# Patient Record
Sex: FEMALE | Race: WHITE | Employment: OTHER
[De-identification: names, ages, dates, MRNs, and addresses within clinical notes are randomized per-mention and may not be internally consistent; named-entity substitution may affect disease eponyms.]

---

## 2017-01-03 ENCOUNTER — OFFICE VISIT (OUTPATIENT)
Dept: PAIN MANAGEMENT | Facility: CLINIC | Age: 72
End: 2017-01-03

## 2017-01-03 VITALS
RESPIRATION RATE: 14 BRPM | HEART RATE: 64 BPM | HEIGHT: 61 IN | BODY MASS INDEX: 24.92 KG/M2 | WEIGHT: 132 LBS | SYSTOLIC BLOOD PRESSURE: 142 MMHG | DIASTOLIC BLOOD PRESSURE: 70 MMHG

## 2017-01-03 DIAGNOSIS — M96.1 POSTLAMINECTOMY SYNDROME, LUMBAR REGION: Chronic | ICD-10-CM

## 2017-01-03 DIAGNOSIS — M96.1 POSTLAMINECTOMY SYNDROME, UNSPECIFIED REGION: ICD-10-CM

## 2017-01-03 DIAGNOSIS — M46.1 SACROILIITIS, NOT ELSEWHERE CLASSIFIED (HCC): ICD-10-CM

## 2017-01-03 DIAGNOSIS — M48.061 SPINAL STENOSIS, LUMBAR REGION, WITHOUT NEUROGENIC CLAUDICATION: Primary | Chronic | ICD-10-CM

## 2017-01-03 DIAGNOSIS — M65.312 TRIGGER FINGER OF LEFT THUMB: ICD-10-CM

## 2017-01-03 DIAGNOSIS — Z51.81 ENCOUNTER FOR MEDICATION MONITORING: Chronic | ICD-10-CM

## 2017-01-03 DIAGNOSIS — M18.11 OSTEOARTHRITIS OF CARPOMETACARPAL JOINT OF RIGHT THUMB, UNSPECIFIED OSTEOARTHRITIS TYPE: ICD-10-CM

## 2017-01-03 DIAGNOSIS — E27.40 ADRENAL INSUFFICIENCY (HCC): ICD-10-CM

## 2017-01-03 LAB
AMPHETAMINE SCREEN, URINE: NEGATIVE
BARBITURATE SCREEN, URINE: POSITIVE
BENZODIAZEPINE SCREEN, URINE: POSITIVE
COCAINE METABOLITE SCREEN URINE: NEGATIVE
MDMA URINE: NORMAL
METHADONE SCREEN, URINE: NEGATIVE
METHAMPHETAMINE, URINE: NEGATIVE
OPIATE SCREEN URINE: NEGATIVE
OXYCODONE SCREEN URINE: POSITIVE
PHENCYCLIDINE SCREEN URINE: NEGATIVE
PROPOXYPHENE SCREEN, URINE: NORMAL
THC: NEGATIVE
TRICYCLIC ANTIDEPRESSANTS, UR: NEGATIVE

## 2017-01-03 PROCEDURE — 99213 OFFICE O/P EST LOW 20 MIN: CPT | Performed by: NURSE PRACTITIONER

## 2017-01-03 PROCEDURE — 80305 DRUG TEST PRSMV DIR OPT OBS: CPT | Performed by: NURSE PRACTITIONER

## 2017-01-03 RX ORDER — LIDOCAINE 50 MG/G
5 OINTMENT TOPICAL 3 TIMES DAILY
Qty: 90 G | Refills: 3 | Status: SHIPPED | OUTPATIENT
Start: 2017-01-03 | End: 2017-03-28 | Stop reason: SDUPTHER

## 2017-01-03 RX ORDER — METHOCARBAMOL 750 MG/1
750 TABLET, FILM COATED ORAL 2 TIMES DAILY PRN
Qty: 60 TABLET | Refills: 2 | Status: SHIPPED | OUTPATIENT
Start: 2017-01-03 | End: 2017-08-15 | Stop reason: SDUPTHER

## 2017-01-03 RX ORDER — OXYCODONE AND ACETAMINOPHEN 10; 325 MG/1; MG/1
1 TABLET ORAL
Qty: 120 TABLET | Refills: 0 | Status: SHIPPED | OUTPATIENT
Start: 2017-01-07 | End: 2017-02-07 | Stop reason: SDUPTHER

## 2017-01-03 RX ORDER — OXYCODONE HYDROCHLORIDE 10 MG/1
10 TABLET, FILM COATED, EXTENDED RELEASE ORAL EVERY 12 HOURS
Qty: 90 TABLET | Refills: 0 | Status: CANCELLED | OUTPATIENT
Start: 2017-01-07 | End: 2017-02-06

## 2017-01-03 ASSESSMENT — ENCOUNTER SYMPTOMS
WHEEZING: 0
EYES NEGATIVE: 1
APNEA: 0
CONSTIPATION: 1
SINUS PRESSURE: 1
COUGH: 0
BACK PAIN: 1
ABDOMINAL PAIN: 0
RHINORRHEA: 1
SHORTNESS OF BREATH: 0

## 2017-02-07 ENCOUNTER — OFFICE VISIT (OUTPATIENT)
Dept: PAIN MANAGEMENT | Facility: CLINIC | Age: 72
End: 2017-02-07

## 2017-02-07 VITALS
HEIGHT: 61 IN | WEIGHT: 137 LBS | RESPIRATION RATE: 14 BRPM | HEART RATE: 60 BPM | SYSTOLIC BLOOD PRESSURE: 112 MMHG | DIASTOLIC BLOOD PRESSURE: 60 MMHG | BODY MASS INDEX: 25.86 KG/M2

## 2017-02-07 DIAGNOSIS — M48.061 SPINAL STENOSIS, LUMBAR REGION, WITHOUT NEUROGENIC CLAUDICATION: Chronic | ICD-10-CM

## 2017-02-07 DIAGNOSIS — M46.1 SACROILIITIS, NOT ELSEWHERE CLASSIFIED (HCC): ICD-10-CM

## 2017-02-07 DIAGNOSIS — M96.1 POSTLAMINECTOMY SYNDROME, UNSPECIFIED REGION: ICD-10-CM

## 2017-02-07 DIAGNOSIS — M96.1 POSTLAMINECTOMY SYNDROME, LUMBAR REGION: Chronic | ICD-10-CM

## 2017-02-07 PROCEDURE — 1090F PRES/ABSN URINE INCON ASSESS: CPT | Performed by: NURSE PRACTITIONER

## 2017-02-07 PROCEDURE — G8484 FLU IMMUNIZE NO ADMIN: HCPCS | Performed by: NURSE PRACTITIONER

## 2017-02-07 PROCEDURE — 4004F PT TOBACCO SCREEN RCVD TLK: CPT | Performed by: NURSE PRACTITIONER

## 2017-02-07 PROCEDURE — 99213 OFFICE O/P EST LOW 20 MIN: CPT | Performed by: NURSE PRACTITIONER

## 2017-02-07 PROCEDURE — 3017F COLORECTAL CA SCREEN DOC REV: CPT | Performed by: NURSE PRACTITIONER

## 2017-02-07 PROCEDURE — G8420 CALC BMI NORM PARAMETERS: HCPCS | Performed by: NURSE PRACTITIONER

## 2017-02-07 PROCEDURE — G8400 PT W/DXA NO RESULTS DOC: HCPCS | Performed by: NURSE PRACTITIONER

## 2017-02-07 PROCEDURE — 3014F SCREEN MAMMO DOC REV: CPT | Performed by: NURSE PRACTITIONER

## 2017-02-07 PROCEDURE — G8427 DOCREV CUR MEDS BY ELIG CLIN: HCPCS | Performed by: NURSE PRACTITIONER

## 2017-02-07 PROCEDURE — 1123F ACP DISCUSS/DSCN MKR DOCD: CPT | Performed by: NURSE PRACTITIONER

## 2017-02-07 PROCEDURE — 4040F PNEUMOC VAC/ADMIN/RCVD: CPT | Performed by: NURSE PRACTITIONER

## 2017-02-07 RX ORDER — OXYCODONE AND ACETAMINOPHEN 10; 325 MG/1; MG/1
1 TABLET ORAL
Qty: 120 TABLET | Refills: 0 | Status: SHIPPED | OUTPATIENT
Start: 2017-02-07 | End: 2017-08-28 | Stop reason: SDUPTHER

## 2017-02-07 RX ORDER — OXYCODONE AND ACETAMINOPHEN 10; 325 MG/1; MG/1
1 TABLET ORAL
Qty: 120 TABLET | Refills: 0 | Status: SHIPPED | OUTPATIENT
Start: 2017-03-09 | End: 2017-03-28 | Stop reason: SDUPTHER

## 2017-02-07 ASSESSMENT — ENCOUNTER SYMPTOMS
FACIAL SWELLING: 1
SHORTNESS OF BREATH: 0
WHEEZING: 0
SINUS PRESSURE: 1
COUGH: 1
ABDOMINAL PAIN: 0
VOMITING: 0
VOICE CHANGE: 1
BACK PAIN: 1
CONSTIPATION: 1
NAUSEA: 0

## 2017-03-28 ENCOUNTER — OFFICE VISIT (OUTPATIENT)
Dept: PAIN MANAGEMENT | Age: 72
End: 2017-03-28
Payer: MEDICARE

## 2017-03-28 VITALS
HEIGHT: 61 IN | SYSTOLIC BLOOD PRESSURE: 141 MMHG | BODY MASS INDEX: 25.68 KG/M2 | HEART RATE: 62 BPM | WEIGHT: 136 LBS | DIASTOLIC BLOOD PRESSURE: 68 MMHG | RESPIRATION RATE: 13 BRPM

## 2017-03-28 DIAGNOSIS — M96.1 POSTLAMINECTOMY SYNDROME, LUMBAR REGION: Chronic | ICD-10-CM

## 2017-03-28 DIAGNOSIS — M48.061 SPINAL STENOSIS, LUMBAR REGION, WITHOUT NEUROGENIC CLAUDICATION: Primary | Chronic | ICD-10-CM

## 2017-03-28 DIAGNOSIS — M96.1 POSTLAMINECTOMY SYNDROME, UNSPECIFIED REGION: ICD-10-CM

## 2017-03-28 DIAGNOSIS — M46.1 SACROILIITIS, NOT ELSEWHERE CLASSIFIED (HCC): ICD-10-CM

## 2017-03-28 DIAGNOSIS — Z51.81 ENCOUNTER FOR MEDICATION MONITORING: ICD-10-CM

## 2017-03-28 PROCEDURE — 3014F SCREEN MAMMO DOC REV: CPT | Performed by: NURSE PRACTITIONER

## 2017-03-28 PROCEDURE — 4004F PT TOBACCO SCREEN RCVD TLK: CPT | Performed by: NURSE PRACTITIONER

## 2017-03-28 PROCEDURE — 99214 OFFICE O/P EST MOD 30 MIN: CPT | Performed by: NURSE PRACTITIONER

## 2017-03-28 PROCEDURE — G8427 DOCREV CUR MEDS BY ELIG CLIN: HCPCS | Performed by: NURSE PRACTITIONER

## 2017-03-28 PROCEDURE — 1123F ACP DISCUSS/DSCN MKR DOCD: CPT | Performed by: NURSE PRACTITIONER

## 2017-03-28 PROCEDURE — G8400 PT W/DXA NO RESULTS DOC: HCPCS | Performed by: NURSE PRACTITIONER

## 2017-03-28 PROCEDURE — G8420 CALC BMI NORM PARAMETERS: HCPCS | Performed by: NURSE PRACTITIONER

## 2017-03-28 PROCEDURE — 1090F PRES/ABSN URINE INCON ASSESS: CPT | Performed by: NURSE PRACTITIONER

## 2017-03-28 PROCEDURE — 3017F COLORECTAL CA SCREEN DOC REV: CPT | Performed by: NURSE PRACTITIONER

## 2017-03-28 PROCEDURE — G8484 FLU IMMUNIZE NO ADMIN: HCPCS | Performed by: NURSE PRACTITIONER

## 2017-03-28 PROCEDURE — 4040F PNEUMOC VAC/ADMIN/RCVD: CPT | Performed by: NURSE PRACTITIONER

## 2017-03-28 RX ORDER — LIDOCAINE 50 MG/G
5 OINTMENT TOPICAL 3 TIMES DAILY
Qty: 90 G | Refills: 3 | Status: SHIPPED | OUTPATIENT
Start: 2017-03-28 | End: 2017-05-16 | Stop reason: SDUPTHER

## 2017-03-28 RX ORDER — GABAPENTIN 600 MG/1
600 TABLET ORAL 3 TIMES DAILY
Qty: 90 TABLET | Refills: 3 | Status: SHIPPED | OUTPATIENT
Start: 2017-03-28 | End: 2017-05-16 | Stop reason: SDUPTHER

## 2017-03-28 RX ORDER — OXYCODONE AND ACETAMINOPHEN 10; 325 MG/1; MG/1
1 TABLET ORAL
Qty: 120 TABLET | Refills: 0 | Status: SHIPPED | OUTPATIENT
Start: 2017-04-08 | End: 2017-08-28 | Stop reason: SDUPTHER

## 2017-03-28 RX ORDER — OXYCODONE AND ACETAMINOPHEN 10; 325 MG/1; MG/1
1 TABLET ORAL
Qty: 120 TABLET | Refills: 0 | Status: SHIPPED | OUTPATIENT
Start: 2017-05-08 | End: 2017-05-16 | Stop reason: SDUPTHER

## 2017-03-28 ASSESSMENT — ENCOUNTER SYMPTOMS
VOICE CHANGE: 1
ABDOMINAL PAIN: 0
VOMITING: 0
NAUSEA: 0
WHEEZING: 0
BACK PAIN: 1
COUGH: 1
CONSTIPATION: 1
SHORTNESS OF BREATH: 0
SINUS PRESSURE: 1

## 2017-05-16 ENCOUNTER — OFFICE VISIT (OUTPATIENT)
Dept: PAIN MANAGEMENT | Age: 72
End: 2017-05-16
Payer: MEDICARE

## 2017-05-16 VITALS
HEART RATE: 68 BPM | RESPIRATION RATE: 14 BRPM | HEIGHT: 61 IN | WEIGHT: 135 LBS | SYSTOLIC BLOOD PRESSURE: 154 MMHG | DIASTOLIC BLOOD PRESSURE: 70 MMHG | BODY MASS INDEX: 25.49 KG/M2

## 2017-05-16 DIAGNOSIS — M46.1 SACROILIITIS, NOT ELSEWHERE CLASSIFIED (HCC): ICD-10-CM

## 2017-05-16 DIAGNOSIS — M96.1 POSTLAMINECTOMY SYNDROME, LUMBAR REGION: Chronic | ICD-10-CM

## 2017-05-16 DIAGNOSIS — M96.1 POSTLAMINECTOMY SYNDROME, UNSPECIFIED REGION: ICD-10-CM

## 2017-05-16 DIAGNOSIS — M48.061 SPINAL STENOSIS, LUMBAR REGION, WITHOUT NEUROGENIC CLAUDICATION: Chronic | ICD-10-CM

## 2017-05-16 PROCEDURE — 3017F COLORECTAL CA SCREEN DOC REV: CPT | Performed by: INTERNAL MEDICINE

## 2017-05-16 PROCEDURE — G8427 DOCREV CUR MEDS BY ELIG CLIN: HCPCS | Performed by: INTERNAL MEDICINE

## 2017-05-16 PROCEDURE — 1090F PRES/ABSN URINE INCON ASSESS: CPT | Performed by: INTERNAL MEDICINE

## 2017-05-16 PROCEDURE — 4040F PNEUMOC VAC/ADMIN/RCVD: CPT | Performed by: INTERNAL MEDICINE

## 2017-05-16 PROCEDURE — 4004F PT TOBACCO SCREEN RCVD TLK: CPT | Performed by: INTERNAL MEDICINE

## 2017-05-16 PROCEDURE — 99214 OFFICE O/P EST MOD 30 MIN: CPT | Performed by: INTERNAL MEDICINE

## 2017-05-16 PROCEDURE — 3014F SCREEN MAMMO DOC REV: CPT | Performed by: INTERNAL MEDICINE

## 2017-05-16 PROCEDURE — 1123F ACP DISCUSS/DSCN MKR DOCD: CPT | Performed by: INTERNAL MEDICINE

## 2017-05-16 PROCEDURE — G8400 PT W/DXA NO RESULTS DOC: HCPCS | Performed by: INTERNAL MEDICINE

## 2017-05-16 PROCEDURE — G8420 CALC BMI NORM PARAMETERS: HCPCS | Performed by: INTERNAL MEDICINE

## 2017-05-16 RX ORDER — GABAPENTIN 600 MG/1
600 TABLET ORAL 3 TIMES DAILY
Qty: 90 TABLET | Refills: 3 | Status: SHIPPED | OUTPATIENT
Start: 2017-05-16 | End: 2017-09-12 | Stop reason: SDUPTHER

## 2017-05-16 RX ORDER — FENTANYL 12 UG/H
1 PATCH TRANSDERMAL
Qty: 10 PATCH | Refills: 0 | Status: SHIPPED | OUTPATIENT
Start: 2017-05-16 | End: 2017-05-16 | Stop reason: SDUPTHER

## 2017-05-16 RX ORDER — OXYCODONE AND ACETAMINOPHEN 10; 325 MG/1; MG/1
1 TABLET ORAL
Qty: 120 TABLET | Refills: 0 | Status: SHIPPED | OUTPATIENT
Start: 2017-06-07 | End: 2017-05-16 | Stop reason: SDUPTHER

## 2017-05-16 RX ORDER — LIDOCAINE 40 MG/G
CREAM TOPICAL
Qty: 45 G | Refills: 5 | Status: SHIPPED | OUTPATIENT
Start: 2017-05-16 | End: 2017-09-12 | Stop reason: SDUPTHER

## 2017-05-16 RX ORDER — LIDOCAINE 40 MG/G
CREAM TOPICAL
Qty: 45 G | Refills: 5 | Status: SHIPPED | OUTPATIENT
Start: 2017-05-16 | End: 2017-05-16 | Stop reason: SDUPTHER

## 2017-05-16 RX ORDER — LIDOCAINE 50 MG/G
5 OINTMENT TOPICAL 3 TIMES DAILY
Qty: 90 G | Refills: 3 | Status: SHIPPED | OUTPATIENT
Start: 2017-05-16 | End: 2018-04-03 | Stop reason: SDUPTHER

## 2017-05-16 RX ORDER — GABAPENTIN 600 MG/1
600 TABLET ORAL 3 TIMES DAILY
Qty: 90 TABLET | Refills: 3 | Status: SHIPPED | OUTPATIENT
Start: 2017-05-16 | End: 2017-05-16 | Stop reason: SDUPTHER

## 2017-05-16 RX ORDER — FENTANYL 12 UG/H
1 PATCH TRANSDERMAL
Qty: 10 PATCH | Refills: 0 | Status: SHIPPED | OUTPATIENT
Start: 2017-05-16 | End: 2017-06-14 | Stop reason: SDUPTHER

## 2017-05-16 RX ORDER — OXYCODONE AND ACETAMINOPHEN 10; 325 MG/1; MG/1
1 TABLET ORAL
Qty: 120 TABLET | Refills: 0 | Status: SHIPPED | OUTPATIENT
Start: 2017-06-07 | End: 2017-06-14 | Stop reason: SDUPTHER

## 2017-05-16 ASSESSMENT — ENCOUNTER SYMPTOMS
BACK PAIN: 1
ABDOMINAL PAIN: 0

## 2017-05-30 ENCOUNTER — TELEPHONE (OUTPATIENT)
Dept: PAIN MANAGEMENT | Age: 72
End: 2017-05-30

## 2017-06-05 ENCOUNTER — TELEPHONE (OUTPATIENT)
Dept: PAIN MANAGEMENT | Age: 72
End: 2017-06-05

## 2017-06-13 ENCOUNTER — HOSPITAL ENCOUNTER (OUTPATIENT)
Dept: GENERAL RADIOLOGY | Age: 72
Discharge: HOME | End: 2017-06-13
Payer: MEDICARE

## 2017-06-13 ENCOUNTER — HOSPITAL ENCOUNTER (OUTPATIENT)
Dept: PAIN MANAGEMENT | Age: 72
Discharge: HOME | End: 2017-06-13
Payer: MEDICARE

## 2017-06-13 VITALS
HEART RATE: 65 BPM | HEIGHT: 61 IN | RESPIRATION RATE: 16 BRPM | WEIGHT: 135 LBS | TEMPERATURE: 98.4 F | OXYGEN SATURATION: 93 % | DIASTOLIC BLOOD PRESSURE: 68 MMHG | BODY MASS INDEX: 25.49 KG/M2 | SYSTOLIC BLOOD PRESSURE: 178 MMHG

## 2017-06-13 DIAGNOSIS — M96.1 POSTLAMINECTOMY SYNDROME, LUMBAR REGION: ICD-10-CM

## 2017-06-13 DIAGNOSIS — M48.061 SPINAL STENOSIS, LUMBAR REGION, WITHOUT NEUROGENIC CLAUDICATION: ICD-10-CM

## 2017-06-13 DIAGNOSIS — M54.16 LUMBAR RADICULOPATHY, CHRONIC: Primary | ICD-10-CM

## 2017-06-13 PROCEDURE — 3209999900 FLUORO FOR SURGICAL PROCEDURES

## 2017-06-13 PROCEDURE — 62327 NJX INTERLAMINAR LMBR/SAC: CPT

## 2017-06-13 PROCEDURE — 62323 NJX INTERLAMINAR LMBR/SAC: CPT | Performed by: PAIN MEDICINE

## 2017-06-13 PROCEDURE — 6360000004 HC RX CONTRAST MEDICATION

## 2017-06-13 PROCEDURE — 6360000002 HC RX W HCPCS

## 2017-06-13 RX ORDER — CITALOPRAM 20 MG/1
20 TABLET ORAL DAILY
COMMUNITY
End: 2017-11-13 | Stop reason: ALTCHOICE

## 2017-06-13 RX ORDER — LEVETIRACETAM 750 MG/1
750 TABLET ORAL 2 TIMES DAILY
COMMUNITY

## 2017-06-13 RX ORDER — FERROUS SULFATE 325(65) MG
325 TABLET ORAL
COMMUNITY

## 2017-06-13 RX ORDER — POLYETHYLENE GLYCOL 3350 17 G/17G
17 POWDER, FOR SOLUTION ORAL DAILY PRN
COMMUNITY

## 2017-06-13 ASSESSMENT — PAIN - FUNCTIONAL ASSESSMENT
PAIN_FUNCTIONAL_ASSESSMENT: 0-10
PAIN_FUNCTIONAL_ASSESSMENT: 0-10

## 2017-06-14 ENCOUNTER — TELEPHONE (OUTPATIENT)
Dept: PAIN MANAGEMENT | Age: 72
End: 2017-06-14

## 2017-06-14 DIAGNOSIS — M96.1 POSTLAMINECTOMY SYNDROME, UNSPECIFIED REGION: ICD-10-CM

## 2017-06-14 DIAGNOSIS — M46.1 SACROILIITIS, NOT ELSEWHERE CLASSIFIED (HCC): ICD-10-CM

## 2017-06-14 DIAGNOSIS — M48.061 SPINAL STENOSIS, LUMBAR REGION, WITHOUT NEUROGENIC CLAUDICATION: Chronic | ICD-10-CM

## 2017-06-14 DIAGNOSIS — M96.1 POSTLAMINECTOMY SYNDROME, LUMBAR REGION: Chronic | ICD-10-CM

## 2017-06-14 RX ORDER — OXYCODONE AND ACETAMINOPHEN 10; 325 MG/1; MG/1
1 TABLET ORAL EVERY 6 HOURS PRN
Qty: 20 TABLET | Refills: 0 | Status: SHIPPED | OUTPATIENT
Start: 2017-07-07 | End: 2017-08-15 | Stop reason: SDUPTHER

## 2017-06-14 RX ORDER — OXYCODONE AND ACETAMINOPHEN 10; 325 MG/1; MG/1
1 TABLET ORAL EVERY 6 HOURS PRN
Qty: 20 TABLET | Refills: 0 | Status: SHIPPED | OUTPATIENT
Start: 2017-06-07 | End: 2017-08-28 | Stop reason: SDUPTHER

## 2017-06-14 RX ORDER — FENTANYL 12 UG/H
1 PATCH TRANSDERMAL
Qty: 10 PATCH | Refills: 0 | Status: SHIPPED | OUTPATIENT
Start: 2017-06-16 | End: 2017-07-11 | Stop reason: SDUPTHER

## 2017-06-28 ENCOUNTER — TELEPHONE (OUTPATIENT)
Dept: PAIN MANAGEMENT | Age: 72
End: 2017-06-28

## 2017-07-11 ENCOUNTER — OFFICE VISIT (OUTPATIENT)
Dept: PAIN MANAGEMENT | Age: 72
End: 2017-07-11
Payer: MEDICARE

## 2017-07-11 VITALS
SYSTOLIC BLOOD PRESSURE: 139 MMHG | RESPIRATION RATE: 14 BRPM | BODY MASS INDEX: 25.49 KG/M2 | HEIGHT: 61 IN | WEIGHT: 135 LBS | HEART RATE: 56 BPM | DIASTOLIC BLOOD PRESSURE: 66 MMHG

## 2017-07-11 DIAGNOSIS — M47.26 OTHER SPONDYLOSIS WITH RADICULOPATHY, LUMBAR REGION: ICD-10-CM

## 2017-07-11 DIAGNOSIS — M96.1 POSTLAMINECTOMY SYNDROME, UNSPECIFIED REGION: ICD-10-CM

## 2017-07-11 DIAGNOSIS — M46.1 SACROILIITIS, NOT ELSEWHERE CLASSIFIED (HCC): ICD-10-CM

## 2017-07-11 DIAGNOSIS — M96.1 POSTLAMINECTOMY SYNDROME, LUMBAR REGION: Chronic | ICD-10-CM

## 2017-07-11 DIAGNOSIS — M48.062 SPINAL STENOSIS OF LUMBAR REGION WITH NEUROGENIC CLAUDICATION: Primary | ICD-10-CM

## 2017-07-11 LAB
AMPHETAMINE SCREEN, URINE: NEGATIVE
BARBITURATE SCREEN, URINE: POSITIVE
BENZODIAZEPINE SCREEN, URINE: NEGATIVE
COCAINE METABOLITE SCREEN URINE: NEGATIVE
MDMA URINE: NORMAL
METHADONE SCREEN, URINE: NEGATIVE
METHAMPHETAMINE, URINE: NEGATIVE
OPIATE SCREEN URINE: NEGATIVE
OXYCODONE SCREEN URINE: POSITIVE
PHENCYCLIDINE SCREEN URINE: NEGATIVE
PROPOXYPHENE SCREEN, URINE: NORMAL
THC: NEGATIVE
TRICYCLIC ANTIDEPRESSANTS, UR: NEGATIVE

## 2017-07-11 PROCEDURE — G8427 DOCREV CUR MEDS BY ELIG CLIN: HCPCS | Performed by: INTERNAL MEDICINE

## 2017-07-11 PROCEDURE — 1090F PRES/ABSN URINE INCON ASSESS: CPT | Performed by: INTERNAL MEDICINE

## 2017-07-11 PROCEDURE — 99214 OFFICE O/P EST MOD 30 MIN: CPT | Performed by: INTERNAL MEDICINE

## 2017-07-11 PROCEDURE — 4004F PT TOBACCO SCREEN RCVD TLK: CPT | Performed by: INTERNAL MEDICINE

## 2017-07-11 PROCEDURE — G8419 CALC BMI OUT NRM PARAM NOF/U: HCPCS | Performed by: INTERNAL MEDICINE

## 2017-07-11 PROCEDURE — 3014F SCREEN MAMMO DOC REV: CPT | Performed by: INTERNAL MEDICINE

## 2017-07-11 PROCEDURE — 4040F PNEUMOC VAC/ADMIN/RCVD: CPT | Performed by: INTERNAL MEDICINE

## 2017-07-11 PROCEDURE — 3017F COLORECTAL CA SCREEN DOC REV: CPT | Performed by: INTERNAL MEDICINE

## 2017-07-11 PROCEDURE — G8400 PT W/DXA NO RESULTS DOC: HCPCS | Performed by: INTERNAL MEDICINE

## 2017-07-11 PROCEDURE — 80305 DRUG TEST PRSMV DIR OPT OBS: CPT | Performed by: INTERNAL MEDICINE

## 2017-07-11 PROCEDURE — 1123F ACP DISCUSS/DSCN MKR DOCD: CPT | Performed by: INTERNAL MEDICINE

## 2017-07-11 RX ORDER — OXYCODONE AND ACETAMINOPHEN 10; 325 MG/1; MG/1
1 TABLET ORAL
Qty: 120 TABLET | Refills: 0 | Status: SHIPPED | OUTPATIENT
Start: 2017-07-11 | End: 2017-08-28 | Stop reason: SDUPTHER

## 2017-07-11 RX ORDER — FENTANYL 12 UG/H
1 PATCH TRANSDERMAL
Qty: 10 PATCH | Refills: 0 | Status: SHIPPED | OUTPATIENT
Start: 2017-07-16 | End: 2017-08-15 | Stop reason: SDUPTHER

## 2017-07-11 ASSESSMENT — ENCOUNTER SYMPTOMS
ABDOMINAL PAIN: 0
BACK PAIN: 1

## 2017-08-15 ENCOUNTER — OFFICE VISIT (OUTPATIENT)
Dept: PAIN MANAGEMENT | Age: 72
End: 2017-08-15
Payer: MEDICARE

## 2017-08-15 VITALS
DIASTOLIC BLOOD PRESSURE: 69 MMHG | HEART RATE: 59 BPM | HEIGHT: 61 IN | RESPIRATION RATE: 14 BRPM | SYSTOLIC BLOOD PRESSURE: 145 MMHG | WEIGHT: 135 LBS | BODY MASS INDEX: 25.49 KG/M2

## 2017-08-15 DIAGNOSIS — M96.1 POSTLAMINECTOMY SYNDROME, UNSPECIFIED REGION: ICD-10-CM

## 2017-08-15 DIAGNOSIS — M96.1 POSTLAMINECTOMY SYNDROME, LUMBAR REGION: Chronic | ICD-10-CM

## 2017-08-15 DIAGNOSIS — M48.061 SPINAL STENOSIS, LUMBAR REGION, WITHOUT NEUROGENIC CLAUDICATION: Chronic | ICD-10-CM

## 2017-08-15 DIAGNOSIS — M46.1 SACROILIITIS, NOT ELSEWHERE CLASSIFIED (HCC): ICD-10-CM

## 2017-08-15 PROCEDURE — 3017F COLORECTAL CA SCREEN DOC REV: CPT | Performed by: INTERNAL MEDICINE

## 2017-08-15 PROCEDURE — G8419 CALC BMI OUT NRM PARAM NOF/U: HCPCS | Performed by: INTERNAL MEDICINE

## 2017-08-15 PROCEDURE — 1090F PRES/ABSN URINE INCON ASSESS: CPT | Performed by: INTERNAL MEDICINE

## 2017-08-15 PROCEDURE — G8427 DOCREV CUR MEDS BY ELIG CLIN: HCPCS | Performed by: INTERNAL MEDICINE

## 2017-08-15 PROCEDURE — 1123F ACP DISCUSS/DSCN MKR DOCD: CPT | Performed by: INTERNAL MEDICINE

## 2017-08-15 PROCEDURE — 4004F PT TOBACCO SCREEN RCVD TLK: CPT | Performed by: INTERNAL MEDICINE

## 2017-08-15 PROCEDURE — 99214 OFFICE O/P EST MOD 30 MIN: CPT | Performed by: INTERNAL MEDICINE

## 2017-08-15 PROCEDURE — G8400 PT W/DXA NO RESULTS DOC: HCPCS | Performed by: INTERNAL MEDICINE

## 2017-08-15 PROCEDURE — 4040F PNEUMOC VAC/ADMIN/RCVD: CPT | Performed by: INTERNAL MEDICINE

## 2017-08-15 PROCEDURE — 3014F SCREEN MAMMO DOC REV: CPT | Performed by: INTERNAL MEDICINE

## 2017-08-15 RX ORDER — OXYCODONE AND ACETAMINOPHEN 10; 325 MG/1; MG/1
1 TABLET ORAL EVERY 6 HOURS PRN
Qty: 20 TABLET | Refills: 0 | Status: SHIPPED | OUTPATIENT
Start: 2017-08-15 | End: 2018-04-03 | Stop reason: SDUPTHER

## 2017-08-15 RX ORDER — FENTANYL 12 UG/H
1 PATCH TRANSDERMAL
Qty: 10 PATCH | Refills: 0 | Status: SHIPPED | OUTPATIENT
Start: 2017-08-15 | End: 2017-09-12 | Stop reason: SDUPTHER

## 2017-08-15 RX ORDER — METHOCARBAMOL 750 MG/1
750 TABLET, FILM COATED ORAL 2 TIMES DAILY PRN
Qty: 60 TABLET | Refills: 2 | Status: SHIPPED | OUTPATIENT
Start: 2017-08-15 | End: 2017-10-10 | Stop reason: SDUPTHER

## 2017-08-15 ASSESSMENT — ENCOUNTER SYMPTOMS
VOMITING: 0
BACK PAIN: 1
DOUBLE VISION: 0
DIARRHEA: 0
NAUSEA: 0
BLURRED VISION: 0
ABDOMINAL PAIN: 0

## 2017-08-23 ENCOUNTER — TELEPHONE (OUTPATIENT)
Dept: PAIN MANAGEMENT | Age: 72
End: 2017-08-23

## 2017-08-28 ENCOUNTER — HOSPITAL ENCOUNTER (OUTPATIENT)
Dept: GENERAL RADIOLOGY | Age: 72
Discharge: HOME | End: 2017-08-28
Payer: MEDICARE

## 2017-08-28 ENCOUNTER — HOSPITAL ENCOUNTER (OUTPATIENT)
Dept: PAIN MANAGEMENT | Age: 72
Discharge: HOME | End: 2017-08-28
Payer: MEDICARE

## 2017-08-28 VITALS
HEIGHT: 61 IN | SYSTOLIC BLOOD PRESSURE: 156 MMHG | HEART RATE: 62 BPM | BODY MASS INDEX: 25.49 KG/M2 | RESPIRATION RATE: 16 BRPM | DIASTOLIC BLOOD PRESSURE: 70 MMHG | TEMPERATURE: 98.4 F | WEIGHT: 135 LBS | OXYGEN SATURATION: 94 %

## 2017-08-28 DIAGNOSIS — M54.16 LUMBAR RADICULOPATHY, CHRONIC: Primary | ICD-10-CM

## 2017-08-28 DIAGNOSIS — M96.1 POSTLAMINECTOMY SYNDROME, UNSPECIFIED REGION: ICD-10-CM

## 2017-08-28 DIAGNOSIS — R52 PAIN: ICD-10-CM

## 2017-08-28 DIAGNOSIS — M48.062 SPINAL STENOSIS OF LUMBAR REGION WITH NEUROGENIC CLAUDICATION: ICD-10-CM

## 2017-08-28 PROCEDURE — 3209999900 FLUORO FOR SURGICAL PROCEDURES

## 2017-08-28 PROCEDURE — 20552 NJX 1/MLT TRIGGER POINT 1/2: CPT

## 2017-08-28 PROCEDURE — 6360000002 HC RX W HCPCS

## 2017-08-28 PROCEDURE — 77002 NEEDLE LOCALIZATION BY XRAY: CPT

## 2017-08-28 PROCEDURE — 62323 NJX INTERLAMINAR LMBR/SAC: CPT | Performed by: PAIN MEDICINE

## 2017-08-28 PROCEDURE — 6360000004 HC RX CONTRAST MEDICATION

## 2017-08-28 RX ORDER — PRIMIDONE 50 MG/1
200 TABLET ORAL NIGHTLY
COMMUNITY

## 2017-08-28 RX ORDER — GUAIFENESIN 600 MG/1
1200 TABLET, EXTENDED RELEASE ORAL 2 TIMES DAILY
COMMUNITY

## 2017-08-28 RX ORDER — DOCUSATE SODIUM 100 MG/1
100 CAPSULE, LIQUID FILLED ORAL 2 TIMES DAILY
COMMUNITY

## 2017-08-28 RX ORDER — OXYCODONE AND ACETAMINOPHEN 10; 325 MG/1; MG/1
1 TABLET ORAL EVERY 6 HOURS
Qty: 120 TABLET | Refills: 0 | Status: SHIPPED | OUTPATIENT
Start: 2017-08-28 | End: 2017-10-10 | Stop reason: SDUPTHER

## 2017-08-28 RX ORDER — OXYCODONE AND ACETAMINOPHEN 10; 325 MG/1; MG/1
1 TABLET ORAL EVERY 6 HOURS
Qty: 120 TABLET | Refills: 0 | Status: SHIPPED | OUTPATIENT
Start: 2017-08-28 | End: 2017-08-28 | Stop reason: SDUPTHER

## 2017-08-28 RX ORDER — MOMETASONE FUROATE 50 UG/1
2 SPRAY, METERED NASAL DAILY
COMMUNITY
End: 2018-10-29

## 2017-08-28 RX ORDER — NITROGLYCERIN 0.4 MG/1
0.4 TABLET SUBLINGUAL EVERY 5 MIN PRN
COMMUNITY

## 2017-08-28 ASSESSMENT — PAIN DESCRIPTION - FREQUENCY: FREQUENCY: CONTINUOUS

## 2017-08-28 ASSESSMENT — PAIN DESCRIPTION - PAIN TYPE: TYPE: CHRONIC PAIN

## 2017-08-28 ASSESSMENT — PAIN DESCRIPTION - LOCATION: LOCATION: BACK

## 2017-08-28 ASSESSMENT — PAIN DESCRIPTION - DESCRIPTORS: DESCRIPTORS: ACHING

## 2017-08-28 ASSESSMENT — PAIN DESCRIPTION - PROGRESSION: CLINICAL_PROGRESSION: NOT CHANGED

## 2017-08-28 ASSESSMENT — ACTIVITIES OF DAILY LIVING (ADL): EFFECT OF PAIN ON DAILY ACTIVITIES: PAIN INCREASES WITH PHYSICAL ACTIVITY

## 2017-08-28 ASSESSMENT — PAIN SCALES - GENERAL: PAINLEVEL_OUTOF10: 8

## 2017-08-28 ASSESSMENT — PAIN DESCRIPTION - ORIENTATION: ORIENTATION: LOWER;LEFT;UPPER

## 2017-08-28 ASSESSMENT — PAIN DESCRIPTION - ONSET: ONSET: ON-GOING

## 2017-08-28 ASSESSMENT — PAIN - FUNCTIONAL ASSESSMENT: PAIN_FUNCTIONAL_ASSESSMENT: 0-10

## 2017-08-29 ENCOUNTER — TELEPHONE (OUTPATIENT)
Dept: PAIN MANAGEMENT | Age: 72
End: 2017-08-29

## 2017-09-12 ENCOUNTER — OFFICE VISIT (OUTPATIENT)
Dept: PAIN MANAGEMENT | Age: 72
End: 2017-09-12
Payer: MEDICARE

## 2017-09-12 VITALS
SYSTOLIC BLOOD PRESSURE: 185 MMHG | HEIGHT: 61 IN | DIASTOLIC BLOOD PRESSURE: 79 MMHG | HEART RATE: 64 BPM | RESPIRATION RATE: 15 BRPM | BODY MASS INDEX: 25.49 KG/M2 | WEIGHT: 135 LBS

## 2017-09-12 DIAGNOSIS — M96.1 POSTLAMINECTOMY SYNDROME, UNSPECIFIED REGION: ICD-10-CM

## 2017-09-12 DIAGNOSIS — M46.1 SACROILIITIS, NOT ELSEWHERE CLASSIFIED (HCC): ICD-10-CM

## 2017-09-12 DIAGNOSIS — M96.1 POSTLAMINECTOMY SYNDROME, LUMBAR REGION: Chronic | ICD-10-CM

## 2017-09-12 DIAGNOSIS — M48.061 SPINAL STENOSIS, LUMBAR REGION, WITHOUT NEUROGENIC CLAUDICATION: Chronic | ICD-10-CM

## 2017-09-12 PROCEDURE — 3017F COLORECTAL CA SCREEN DOC REV: CPT | Performed by: INTERNAL MEDICINE

## 2017-09-12 PROCEDURE — 4040F PNEUMOC VAC/ADMIN/RCVD: CPT | Performed by: INTERNAL MEDICINE

## 2017-09-12 PROCEDURE — G8427 DOCREV CUR MEDS BY ELIG CLIN: HCPCS | Performed by: INTERNAL MEDICINE

## 2017-09-12 PROCEDURE — 3014F SCREEN MAMMO DOC REV: CPT | Performed by: INTERNAL MEDICINE

## 2017-09-12 PROCEDURE — G8417 CALC BMI ABV UP PARAM F/U: HCPCS | Performed by: INTERNAL MEDICINE

## 2017-09-12 PROCEDURE — 99214 OFFICE O/P EST MOD 30 MIN: CPT | Performed by: INTERNAL MEDICINE

## 2017-09-12 PROCEDURE — G8400 PT W/DXA NO RESULTS DOC: HCPCS | Performed by: INTERNAL MEDICINE

## 2017-09-12 PROCEDURE — 1090F PRES/ABSN URINE INCON ASSESS: CPT | Performed by: INTERNAL MEDICINE

## 2017-09-12 PROCEDURE — 4004F PT TOBACCO SCREEN RCVD TLK: CPT | Performed by: INTERNAL MEDICINE

## 2017-09-12 PROCEDURE — 1123F ACP DISCUSS/DSCN MKR DOCD: CPT | Performed by: INTERNAL MEDICINE

## 2017-09-12 RX ORDER — OXYCODONE AND ACETAMINOPHEN 10; 325 MG/1; MG/1
1 TABLET ORAL EVERY 6 HOURS
Qty: 120 TABLET | Refills: 0 | Status: SHIPPED | OUTPATIENT
Start: 2017-09-28 | End: 2018-04-03 | Stop reason: SDUPTHER

## 2017-09-12 RX ORDER — FENTANYL 12 UG/H
1 PATCH TRANSDERMAL
Qty: 10 PATCH | Refills: 0 | Status: SHIPPED | OUTPATIENT
Start: 2017-09-15 | End: 2017-10-10 | Stop reason: SDUPTHER

## 2017-09-12 RX ORDER — LIDOCAINE 40 MG/G
CREAM TOPICAL
Qty: 45 G | Refills: 5 | Status: SHIPPED | OUTPATIENT
Start: 2017-09-12 | End: 2017-11-13 | Stop reason: SDUPTHER

## 2017-09-12 RX ORDER — GABAPENTIN 600 MG/1
600 TABLET ORAL 3 TIMES DAILY
Qty: 90 TABLET | Refills: 3 | Status: SHIPPED | OUTPATIENT
Start: 2017-09-12 | End: 2018-02-05 | Stop reason: SDUPTHER

## 2017-09-12 ASSESSMENT — ENCOUNTER SYMPTOMS
BACK PAIN: 1
ABDOMINAL PAIN: 0

## 2017-09-27 ENCOUNTER — TELEPHONE (OUTPATIENT)
Dept: PAIN MANAGEMENT | Age: 72
End: 2017-09-27

## 2017-09-28 NOTE — TELEPHONE ENCOUNTER
Talked to [facility] today. They have to figure out why she is running out and they said they will and will call us. Please leave a message if there is a pattern that is running out of Rx. Also as far as I know Pharmacy can fill it two days early ( legally allowed to fill two days early) as well, so this type of difficulty does not arise.      Electronically signed by Candelaria Uriostegui MD on 9/28/2017 at 11:55 AM

## 2017-10-10 ENCOUNTER — OFFICE VISIT (OUTPATIENT)
Dept: PAIN MANAGEMENT | Age: 72
End: 2017-10-10
Payer: MEDICARE

## 2017-10-10 VITALS
SYSTOLIC BLOOD PRESSURE: 152 MMHG | RESPIRATION RATE: 15 BRPM | HEIGHT: 61 IN | HEART RATE: 61 BPM | BODY MASS INDEX: 25.49 KG/M2 | DIASTOLIC BLOOD PRESSURE: 71 MMHG | WEIGHT: 135 LBS

## 2017-10-10 DIAGNOSIS — M46.1 SACROILIITIS, NOT ELSEWHERE CLASSIFIED (HCC): ICD-10-CM

## 2017-10-10 DIAGNOSIS — M96.1 POSTLAMINECTOMY SYNDROME, UNSPECIFIED REGION: ICD-10-CM

## 2017-10-10 DIAGNOSIS — M48.061 SPINAL STENOSIS, LUMBAR REGION, WITHOUT NEUROGENIC CLAUDICATION: Chronic | ICD-10-CM

## 2017-10-10 DIAGNOSIS — M96.1 POSTLAMINECTOMY SYNDROME, LUMBAR REGION: Chronic | ICD-10-CM

## 2017-10-10 PROCEDURE — G8484 FLU IMMUNIZE NO ADMIN: HCPCS | Performed by: INTERNAL MEDICINE

## 2017-10-10 PROCEDURE — 4004F PT TOBACCO SCREEN RCVD TLK: CPT | Performed by: INTERNAL MEDICINE

## 2017-10-10 PROCEDURE — 1123F ACP DISCUSS/DSCN MKR DOCD: CPT | Performed by: INTERNAL MEDICINE

## 2017-10-10 PROCEDURE — 3017F COLORECTAL CA SCREEN DOC REV: CPT | Performed by: INTERNAL MEDICINE

## 2017-10-10 PROCEDURE — 3014F SCREEN MAMMO DOC REV: CPT | Performed by: INTERNAL MEDICINE

## 2017-10-10 PROCEDURE — G8427 DOCREV CUR MEDS BY ELIG CLIN: HCPCS | Performed by: INTERNAL MEDICINE

## 2017-10-10 PROCEDURE — G8417 CALC BMI ABV UP PARAM F/U: HCPCS | Performed by: INTERNAL MEDICINE

## 2017-10-10 PROCEDURE — 1090F PRES/ABSN URINE INCON ASSESS: CPT | Performed by: INTERNAL MEDICINE

## 2017-10-10 PROCEDURE — 99214 OFFICE O/P EST MOD 30 MIN: CPT | Performed by: INTERNAL MEDICINE

## 2017-10-10 PROCEDURE — 4040F PNEUMOC VAC/ADMIN/RCVD: CPT | Performed by: INTERNAL MEDICINE

## 2017-10-10 PROCEDURE — G8400 PT W/DXA NO RESULTS DOC: HCPCS | Performed by: INTERNAL MEDICINE

## 2017-10-10 RX ORDER — FENTANYL 12 UG/H
1 PATCH TRANSDERMAL
Qty: 10 PATCH | Refills: 0 | Status: SHIPPED | OUTPATIENT
Start: 2017-10-15 | End: 2017-11-13 | Stop reason: SDUPTHER

## 2017-10-10 RX ORDER — METHOCARBAMOL 750 MG/1
750 TABLET, FILM COATED ORAL 2 TIMES DAILY PRN
Qty: 60 TABLET | Refills: 2 | Status: SHIPPED | OUTPATIENT
Start: 2017-10-10 | End: 2018-04-03 | Stop reason: ALTCHOICE

## 2017-10-10 RX ORDER — OXYCODONE AND ACETAMINOPHEN 10; 325 MG/1; MG/1
1 TABLET ORAL EVERY 6 HOURS
Qty: 120 TABLET | Refills: 0 | Status: SHIPPED | OUTPATIENT
Start: 2017-10-15 | End: 2017-11-13 | Stop reason: SDUPTHER

## 2017-10-10 ASSESSMENT — ENCOUNTER SYMPTOMS
BACK PAIN: 1
ABDOMINAL PAIN: 0

## 2017-10-10 NOTE — PROGRESS NOTES
Current Outpatient Prescriptions   Medication Sig Dispense Refill    [START ON 10/15/2017] fentaNYL (DURAGESIC) 12 MCG/HR Place 1 patch onto the skin every 72 hours . Earliest Fill Date: 10/15/17 10 patch 0    [START ON 10/15/2017] oxyCODONE-acetaminophen (PERCOCET)  MG per tablet Take 1 tablet by mouth every 6 hours  Please give up to 4 AM, 10 AM, 4 PM, 10 PM. Earliest Fill Date: 10/15/17 120 tablet 0    methocarbamol (ROBAXIN) 750 MG tablet Take 1 tablet by mouth 2 times daily as needed (painful muscle spasms in legs.) 60 tablet 2    lidocaine (ASPERCREME W/LIDOCAINE) 4 % cream Indications: Knee Pain, Pain in Joints of Foot and Toes Apply topically as needed. 45 g 5    gabapentin (NEURONTIN) 600 MG tablet Take 1 tablet by mouth 3 times daily 90 tablet 3    primidone (MYSOLINE) 50 MG tablet Take 100 mg by mouth nightly      mometasone (NASONEX) 50 MCG/ACT nasal spray 2 sprays by Nasal route daily      nefazodone (SERZONE) 100 MG tablet Take 100 mg by mouth daily      nefazodone (SERZONE) 100 MG tablet Take 100 mg by mouth daily      Hypromellose (ARTIFICIAL TEARS OP) Apply to eye      docusate sodium (COLACE) 100 MG capsule Take 100 mg by mouth 2 times daily      bisacodyl (DULCOLAX) 5 MG EC tablet Take 5 mg by mouth daily as needed for Constipation      guaiFENesin (MUCINEX) 600 MG extended release tablet Take 1,200 mg by mouth 2 times daily      nitroGLYCERIN (NITROSTAT) 0.4 MG SL tablet Place 0.4 mg under the tongue every 5 minutes as needed for Chest pain up to max of 3 total doses. If no relief after 1 dose, call 911.  oxyCODONE-acetaminophen (PERCOCET)  MG per tablet Take 1 tablet by mouth every 6 hours as needed for Pain .  Earliest Fill Date: 8/15/17 20 tablet 0    citalopram (CELEXA) 20 MG tablet Take 20 mg by mouth daily      Calcium Carbonate-Vitamin D (CALCIUM-VITAMIN D) 500-200 MG-UNIT per tablet Take 1 tablet by mouth 2 times daily (with meals) Indications: 600MG-400      ferrous sulfate 325 (65 Fe) MG tablet Take 325 mg by mouth daily (with breakfast)      levETIRAcetam (KEPPRA) 750 MG tablet Take 750 mg by mouth 2 times daily      polyethylene glycol (GLYCOLAX) packet Take 17 g by mouth daily as needed for Constipation      Simethicone 40 MG/0.6ML LIQD Take 125 mg by mouth      lidocaine (XYLOCAINE) 5 % ointment Apply 5 g topically 3 times daily Apply topically as needed. 90 g 3    b complex vitamins capsule Take 1 capsule by mouth daily      carbamide peroxide (DEBROX) 6.5 % otic solution 2 drops 2 times daily      phenytoin (DILANTIN) 100 MG ER capsule Take 100 mg by mouth 2 times daily      loratadine (CLARITIN) 10 MG capsule Take 10 mg by mouth daily      promethazine (PHENERGAN) 25 MG tablet Take 25 mg by mouth every 6 hours as needed for Nausea      amLODIPine (NORVASC) 2.5 MG tablet Take 5 mg by mouth daily       acetaminophen (TYLENOL) 500 MG tablet Take 500 mg by mouth every 6 hours as needed for Pain      diphenhydrAMINE (BENADRYL) 25 MG tablet Take 25 mg by mouth every 6 hours as needed for Itching      loperamide (IMODIUM) 2 MG capsule Take 2 mg by mouth 4 times daily as needed for Diarrhea      risperiDONE (RISPERDAL) 0.25 MG tablet Take 0.25 mg by mouth 2 times daily      metoprolol (TOPROL-XL) 25 MG XL tablet Take 25 mg by mouth daily.  hydrocortisone (CORTEF) 5 MG tablet Take 5 mg by mouth daily.  aspirin 81 MG tablet Take 81 mg by mouth daily.  furosemide (LASIX) 20 MG tablet Take 40 mg by mouth daily       LORazepam (ATIVAN) 0.5 MG tablet Take 0.5 mg by mouth every 8 hours as needed for Anxiety. May take a fourth pill as needed for extreme stress      lisinopril (PRINIVIL;ZESTRIL) 20 MG tablet Take 20 mg by mouth daily.  methimazole (TAPAZOLE) 10 MG tablet Take 10 mg by mouth daily Take one daily for only 7 days a week.       esomeprazole (NEXIUM) 40 MG capsule Take 40 mg by mouth every morning (before 4 AM, 10 AM, 4 PM, 10 PM. Earliest Fill Date: 10/15/17 120 tablet 0    methocarbamol (ROBAXIN) 750 MG tablet Take 1 tablet by mouth 2 times daily as needed (painful muscle spasms in legs.) 60 tablet 2    lidocaine (ASPERCREME W/LIDOCAINE) 4 % cream Indications: Knee Pain, Pain in Joints of Foot and Toes Apply topically as needed. 45 g 5    gabapentin (NEURONTIN) 600 MG tablet Take 1 tablet by mouth 3 times daily 90 tablet 3    primidone (MYSOLINE) 50 MG tablet Take 100 mg by mouth nightly      mometasone (NASONEX) 50 MCG/ACT nasal spray 2 sprays by Nasal route daily      nefazodone (SERZONE) 100 MG tablet Take 100 mg by mouth daily      nefazodone (SERZONE) 100 MG tablet Take 100 mg by mouth daily      Hypromellose (ARTIFICIAL TEARS OP) Apply to eye      docusate sodium (COLACE) 100 MG capsule Take 100 mg by mouth 2 times daily      bisacodyl (DULCOLAX) 5 MG EC tablet Take 5 mg by mouth daily as needed for Constipation      guaiFENesin (MUCINEX) 600 MG extended release tablet Take 1,200 mg by mouth 2 times daily      nitroGLYCERIN (NITROSTAT) 0.4 MG SL tablet Place 0.4 mg under the tongue every 5 minutes as needed for Chest pain up to max of 3 total doses. If no relief after 1 dose, call 911.  oxyCODONE-acetaminophen (PERCOCET)  MG per tablet Take 1 tablet by mouth every 6 hours as needed for Pain .  Earliest Fill Date: 8/15/17 20 tablet 0    citalopram (CELEXA) 20 MG tablet Take 20 mg by mouth daily      Calcium Carbonate-Vitamin D (CALCIUM-VITAMIN D) 500-200 MG-UNIT per tablet Take 1 tablet by mouth 2 times daily (with meals) Indications: 600MG-400      ferrous sulfate 325 (65 Fe) MG tablet Take 325 mg by mouth daily (with breakfast)      levETIRAcetam (KEPPRA) 750 MG tablet Take 750 mg by mouth 2 times daily      polyethylene glycol (GLYCOLAX) packet Take 17 g by mouth daily as needed for Constipation      Simethicone 40 MG/0.6ML LIQD Take 125 mg by mouth      lidocaine (XYLOCAINE) 5 40 mg  40 mg Intra-articular Once Oralia Ramires MD        bupivacaine (MARCAINE) 0.5 % injection 150 mg  30 mL Intra-articular Once Oralia Ramires MD        lidocaine 2 % injection 5 mL  5 mL Intradermal Once Oralia Ramires MD        iohexol (OMNIPAQUE 180) injection 3 mL  3 mL Other ONCE PRN Oralia Ramires MD           Allergies:      Allergies   Allergen Reactions    Flagyl [Metronidazole]     Morphine Itching and Swelling     Of eye lids    Nsaids      Kidney problems    Statins Other (See Comments)     Joint pain     Keflex [Cephalexin] Nausea And Vomiting       Past Medical History:     Past Medical History:   Diagnosis Date    Adrenal insufficiency (Moe's disease) ([facility])     on hydrocortisone daily    Anemia     Anxiety     Anxiety     Atrial fibrillation ([facility])     CAD (coronary artery disease)     Cellulitis     Cerebral vascular disease     CHF (congestive heart failure) ([facility])     Chronic kidney disease     Colon cancer ([facility])     Depression     Fibromyalgia     Fracture     right foot, no surgery    Heart disease     History of blood transfusion     Hyperlipidemia     Hypertension     Hypokalemia     Lumbosacral spondylosis without myelopathy     Myalgia and myositis, unspecified     Myocardial infarct ([facility])     Opioid type dependence, continuous ([facility])     Osteoarthritis of ankle, left     Osteoarthritis of both hips     Osteoarthritis of both knees     Osteoporosis     Peripheral neuropathy ([facility])     Peripheral vascular disease ([facility])     Polyneuropathy ([facility])     Post traumatic stress disorder (PTSD)     Postlaminectomy syndrome, lumbar region     Thyrotoxic crisis     Tremors of nervous system        Past Surgical History:     Past Surgical History:   Procedure Laterality Date    ABDOMEN SURGERY      bowel resection    ANGIOPLASTY      LCx, Prox    APPENDECTOMY      BACK SURGERY      CARDIAC SURGERY      2 heart stents    CAROTID ENDARTERECTOMY Bilateral      SECTION      x1    COLECTOMY  2009    EYE SURGERY      eye lids    FRACTURE SURGERY      left ankle    HYSTERECTOMY      LUMBAR FUSION  2006    OVARY REMOVAL      TONSILLECTOMY         Family History:     Family History   Problem Relation Age of Onset    Heart Disease Mother     Diabetes Mother     Other Mother      lung disease       Social History:      TOBACCO:   reports that she has been smoking. She has a 75.00 pack-year smoking history. She has never used smokeless tobacco.  ETOH:   reports that she does not drink alcohol. REVIEW OF SYSTEMS:     Review of Systems   Constitutional: Negative for fever and weight loss. Cardiovascular: Negative for chest pain. Gastrointestinal: Negative for abdominal pain (lower abdominal pain due to cramps and gaseousness. She had C.difficile. x 3 months. ). Genitourinary: Negative for dysuria. Musculoskeletal: Positive for back pain. Neurological: Positive for tingling, weakness and numbness. Negative for headaches. PHYSICAL EXAM:     Recent Vitals:     Vitals:    10/10/17 1159   BP: (!) 152/71   Pulse: 61   Resp: 15     Body mass index is 25.51 kg/m². Physical Exam   Constitutional: She is oriented to person, place, and time. Vital signs are normal. She appears well-developed and well-nourished. No distress. HENT:   Head: Normocephalic and atraumatic. Eyes: Conjunctivae and EOM are normal.   Neck: Trachea normal. Neck supple. Cardiovascular: An irregular rhythm present. Occasional extrasystoles are present. Exam reveals decreased pulses. Murmur heard. Systolic murmur is present with a grade of 2/6   2 to 3 + leg edema bilateral.    Pulmonary/Chest: Effort normal and breath sounds normal. No respiratory distress. Abdominal: Bowel sounds are normal. She exhibits no distension. There is no tenderness. Musculoskeletal: She exhibits deformity (thoracic kyphosis,  absence of lumbar lordosis). She exhibits no edema. Arms:  Lumbar movements of flexion, extension, lateral flexion and rotation are painful and restricted. SLR seated is painful bilaterally. Kyree's test not attempted. Sensations reduced over lower extremities. Neurological: She is alert and oriented to person, place, and time. She has normal reflexes. She is not disoriented. She displays atrophy. No sensory deficit. Gait abnormal.   Skin: Skin is warm, dry and intact. No rash noted. Psychiatric: She has a normal mood and affect. Her speech is normal and behavior is normal. Judgment and thought content normal. Cognition and memory are normal.   Nursing note and vitals reviewed. Ortho Exam      Assessment:     DIAGNOSIS:  1. Spinal stenosis, lumbar region, without neurogenic claudication    2. Postlaminectomy syndrome, lumbar region    3. Postlaminectomy syndrome, unspecified region    4. Sacroiliitis, not elsewhere classified [facility])          Treatment Plan:       Interventional Treatment:     No    Medical Necessity for Intervention:    See Chief complaint, HPI, Physical Examination. [x]The patient's questions were answered to the best of my abilities. Medical Management Plan: We will continue current pain medications. Current medications are being tolerated without any Adverse side effects. Goals for today's visit include to decrease pain to a lesser level, ability to engage in daily activities, decrease pain medication use, decrease health care utilization, muscle strengthening exercises. Controlled Substances Monitoring: Attestation: The Prescription Monitoring Report was requested today but not available. (she lives at an [facility]. ) (Dulce Horn MD)  Documentation: Possible medication side effects, risk of tolerance and/or dependence, and alternative treatments discussed., Obtaining appropriate analgesic effect of treatment., Existing medication contract.  (Dulce Horn MD)    Orders Placed

## 2017-11-13 ENCOUNTER — OFFICE VISIT (OUTPATIENT)
Dept: PAIN MANAGEMENT | Age: 72
End: 2017-11-13
Payer: MEDICARE

## 2017-11-13 VITALS
WEIGHT: 125 LBS | DIASTOLIC BLOOD PRESSURE: 82 MMHG | SYSTOLIC BLOOD PRESSURE: 175 MMHG | HEART RATE: 84 BPM | HEIGHT: 61 IN | RESPIRATION RATE: 16 BRPM | BODY MASS INDEX: 23.6 KG/M2

## 2017-11-13 DIAGNOSIS — M96.1 POSTLAMINECTOMY SYNDROME, LUMBAR REGION: Chronic | ICD-10-CM

## 2017-11-13 DIAGNOSIS — Z51.81 ENCOUNTER FOR MEDICATION MONITORING: Primary | Chronic | ICD-10-CM

## 2017-11-13 DIAGNOSIS — M48.061 SPINAL STENOSIS, LUMBAR REGION, WITHOUT NEUROGENIC CLAUDICATION: Chronic | ICD-10-CM

## 2017-11-13 DIAGNOSIS — M54.16 LUMBAR RADICULOPATHY, CHRONIC: ICD-10-CM

## 2017-11-13 DIAGNOSIS — M46.1 SACROILIITIS, NOT ELSEWHERE CLASSIFIED (HCC): ICD-10-CM

## 2017-11-13 PROCEDURE — 3017F COLORECTAL CA SCREEN DOC REV: CPT | Performed by: INTERNAL MEDICINE

## 2017-11-13 PROCEDURE — G8400 PT W/DXA NO RESULTS DOC: HCPCS | Performed by: INTERNAL MEDICINE

## 2017-11-13 PROCEDURE — G8427 DOCREV CUR MEDS BY ELIG CLIN: HCPCS | Performed by: INTERNAL MEDICINE

## 2017-11-13 PROCEDURE — 3014F SCREEN MAMMO DOC REV: CPT | Performed by: INTERNAL MEDICINE

## 2017-11-13 PROCEDURE — G8484 FLU IMMUNIZE NO ADMIN: HCPCS | Performed by: INTERNAL MEDICINE

## 2017-11-13 PROCEDURE — 1090F PRES/ABSN URINE INCON ASSESS: CPT | Performed by: INTERNAL MEDICINE

## 2017-11-13 PROCEDURE — 4040F PNEUMOC VAC/ADMIN/RCVD: CPT | Performed by: INTERNAL MEDICINE

## 2017-11-13 PROCEDURE — 1123F ACP DISCUSS/DSCN MKR DOCD: CPT | Performed by: INTERNAL MEDICINE

## 2017-11-13 PROCEDURE — 99214 OFFICE O/P EST MOD 30 MIN: CPT | Performed by: INTERNAL MEDICINE

## 2017-11-13 PROCEDURE — 4004F PT TOBACCO SCREEN RCVD TLK: CPT | Performed by: INTERNAL MEDICINE

## 2017-11-13 PROCEDURE — G8420 CALC BMI NORM PARAMETERS: HCPCS | Performed by: INTERNAL MEDICINE

## 2017-11-13 RX ORDER — METHOCARBAMOL 750 MG/1
750 TABLET, FILM COATED ORAL 2 TIMES DAILY PRN
Qty: 60 TABLET | Refills: 2 | Status: SHIPPED | OUTPATIENT
Start: 2017-11-13 | End: 2018-02-05 | Stop reason: SDUPTHER

## 2017-11-13 RX ORDER — FENTANYL 12 UG/H
1 PATCH TRANSDERMAL
Qty: 10 PATCH | Refills: 0 | Status: SHIPPED | OUTPATIENT
Start: 2017-11-14 | End: 2017-12-12 | Stop reason: SDUPTHER

## 2017-11-13 RX ORDER — LIDOCAINE 40 MG/G
CREAM TOPICAL
Qty: 45 G | Refills: 5 | Status: SHIPPED | OUTPATIENT
Start: 2017-11-13 | End: 2018-02-05 | Stop reason: SDUPTHER

## 2017-11-13 RX ORDER — OXYCODONE AND ACETAMINOPHEN 10; 325 MG/1; MG/1
1 TABLET ORAL EVERY 6 HOURS
Qty: 120 TABLET | Refills: 0 | Status: SHIPPED | OUTPATIENT
Start: 2017-11-14 | End: 2017-12-12 | Stop reason: SDUPTHER

## 2017-11-13 ASSESSMENT — ENCOUNTER SYMPTOMS
SHORTNESS OF BREATH: 0
BACK PAIN: 1
DIARRHEA: 0
COUGH: 0
NAUSEA: 0
VOMITING: 0
BLURRED VISION: 0
ABDOMINAL PAIN: 0
ORTHOPNEA: 0

## 2017-11-13 NOTE — PROGRESS NOTES
Chronic Pain Assessment Update  Last procedure: --   Date:-  Relief--n/a    Since your last visit to the [facility]    have you been seen for pain by Anyone:No     Is any change in her health: Yes     Are you satisfied with your pain control? Yes    Do you have any questions or concerns? Yes    ADVERSE MEDICATION EFFECTS:   Constipation: yes  Nausea/ vomiting:no  Drowsiness:  no  Urinary Retention: no  Any other side effects: no    ACTIVITY/EMOTIONAL:  Sleep Pattern:. nightime awakenings  Activity Level:  unchanged  Currently in Physical Therapy:  No   Currently seeing a Psychiatrist or Psychologist:  Yes    ABERRANT BEHAVIORS SINCE LAST VISIT  Taking more medication than prescribed:----no  Received pain medications from anyone:---- no  Used  alcohol or any illegal drugs---------------no    Compliance:  pill count compliant:  Urine Drug Screen or --yes

## 2017-11-13 NOTE — PROGRESS NOTES
eye      docusate sodium (COLACE) 100 MG capsule Take 100 mg by mouth 2 times daily      bisacodyl (DULCOLAX) 5 MG EC tablet Take 5 mg by mouth daily as needed for Constipation      guaiFENesin (MUCINEX) 600 MG extended release tablet Take 1,200 mg by mouth 2 times daily      nitroGLYCERIN (NITROSTAT) 0.4 MG SL tablet Place 0.4 mg under the tongue every 5 minutes as needed for Chest pain up to max of 3 total doses. If no relief after 1 dose, call 911.  oxyCODONE-acetaminophen (PERCOCET)  MG per tablet Take 1 tablet by mouth every 6 hours as needed for Pain . Earliest Fill Date: 8/15/17 20 tablet 0    Calcium Carbonate-Vitamin D (CALCIUM-VITAMIN D) 500-200 MG-UNIT per tablet Take 1 tablet by mouth 2 times daily (with meals) Indications: 600MG-400      ferrous sulfate 325 (65 Fe) MG tablet Take 325 mg by mouth daily (with breakfast)      levETIRAcetam (KEPPRA) 750 MG tablet Take 750 mg by mouth 2 times daily      polyethylene glycol (GLYCOLAX) packet Take 17 g by mouth daily as needed for Constipation      Simethicone 40 MG/0.6ML LIQD Take 125 mg by mouth      lidocaine (XYLOCAINE) 5 % ointment Apply 5 g topically 3 times daily Apply topically as needed.  90 g 3    b complex vitamins capsule Take 1 capsule by mouth daily      carbamide peroxide (DEBROX) 6.5 % otic solution 2 drops 2 times daily      phenytoin (DILANTIN) 100 MG ER capsule Take 100 mg by mouth 2 times daily      loratadine (CLARITIN) 10 MG capsule Take 10 mg by mouth daily      promethazine (PHENERGAN) 25 MG tablet Take 25 mg by mouth every 6 hours as needed for Nausea      amLODIPine (NORVASC) 2.5 MG tablet Take 5 mg by mouth daily       acetaminophen (TYLENOL) 500 MG tablet Take 500 mg by mouth every 6 hours as needed for Pain      diphenhydrAMINE (BENADRYL) 25 MG tablet Take 25 mg by mouth every 6 hours as needed for Itching      loperamide (IMODIUM) 2 MG capsule Take 2 mg by mouth 4 times daily as needed for Diarrhea  risperiDONE (RISPERDAL) 0.25 MG tablet Take 0.25 mg by mouth 2 times daily      metoprolol (TOPROL-XL) 25 MG XL tablet Take 25 mg by mouth daily.  hydrocortisone (CORTEF) 5 MG tablet Take 5 mg by mouth daily.  aspirin 81 MG tablet Take 81 mg by mouth daily.  furosemide (LASIX) 20 MG tablet Take 40 mg by mouth daily       LORazepam (ATIVAN) 0.5 MG tablet Take 0.5 mg by mouth every 8 hours as needed for Anxiety. May take a fourth pill as needed for extreme stress      lisinopril (PRINIVIL;ZESTRIL) 20 MG tablet Take 20 mg by mouth daily.  methimazole (TAPAZOLE) 10 MG tablet Take 10 mg by mouth daily Take one daily for only 7 days a week.  esomeprazole (NEXIUM) 40 MG capsule Take 40 mg by mouth every morning (before breakfast).  methocarbamol (ROBAXIN) 750 MG tablet Take 1 tablet by mouth 2 times daily as needed (painful muscle spasms in legs.) 60 tablet 2    oxyCODONE-acetaminophen (PERCOCET)  MG per tablet Take 1 tablet by mouth every 6 hours for 7 days  Please give up to 4 AM, 10 AM, 4 PM, 10 PM. Earliest Fill Date: 9/28/17 120 tablet 0     Current Facility-Administered Medications   Medication Dose Route Frequency Provider Last Rate Last Dose    triamcinolone acetonide (KENALOG-40) injection 40 mg  40 mg Intra-articular Once Ashwin Sanabria MD        bupivacaine (MARCAINE) 0.5 % injection 150 mg  30 mL Intra-articular Once Ashwin Sanabria MD        lidocaine 2 % injection 5 mL  5 mL Intradermal Once Ashwin Sanabria MD        iohexol (OMNIPAQUE 180) injection 3 mL  3 mL Other ONCE PRN Ashwin Sanabria MD           Allergies:      Allergies   Allergen Reactions    Flagyl [Metronidazole]     Morphine Itching and Swelling     Of eye lids    Nsaids      Kidney problems    Statins Other (See Comments)     Joint pain     Keflex [Cephalexin] Nausea And Vomiting       Past Medical History:     Past Medical History:   Diagnosis Date    Adrenal insufficiency ([place]'s disease) ([facility])     on hydrocortisone daily    Anemia     Anxiety     Anxiety     Atrial fibrillation (HCC)     CAD (coronary artery disease)     Cellulitis     Cerebral vascular disease     CHF (congestive heart failure) ([facility])     Chronic kidney disease     Colon cancer ([facility])     Depression     Fibromyalgia     Fracture     right foot, no surgery    Heart disease     History of blood transfusion     Hyperlipidemia     Hypertension     Hypokalemia     Lumbosacral spondylosis without myelopathy     Myalgia and myositis, unspecified     Myocardial infarct     Opioid type dependence, continuous ([facility])     Osteoarthritis of ankle, left     Osteoarthritis of both hips     Osteoarthritis of both knees     Osteoporosis     Peripheral neuropathy ([address]     Peripheral vascular disease ([address]     Polyneuropathy ([address]     Post traumatic stress disorder (PTSD)     Postlaminectomy syndrome, lumbar region     Thyrotoxic crisis     Tremors of nervous system        Past Surgical History:     Past Surgical History:   Procedure Laterality Date    ABDOMEN SURGERY      bowel resection    ANGIOPLASTY      LCx, Prox    APPENDECTOMY      BACK SURGERY      CARDIAC SURGERY      2 heart stents    CAROTID ENDARTERECTOMY Bilateral      SECTION      x1    COLECTOMY  2009    EYE SURGERY      eye lids    FRACTURE SURGERY      left ankle    HYSTERECTOMY      LUMBAR FUSION  2006    OVARY REMOVAL      TONSILLECTOMY         Family History:     Family History   Problem Relation Age of Onset    Heart Disease Mother     Diabetes Mother     Other Mother      lung disease       Social History:      TOBACCO:   reports that she has been smoking. She has a 75.00 pack-year smoking history. She has never used smokeless tobacco.  ETOH:   reports that she does not drink alcohol.     REVIEW OF SYSTEMS:     Review of Systems   Constitutional: Positive for chills, diaphoresis, fever and malaise/fatigue. Negative for weight loss. She was ill with Pneumonia around mid October. She was hospitalized on 10/16/17. Eyes: Negative for blurred vision. Respiratory: Negative for cough and shortness of breath. No symptoms at this time. Cardiovascular: Positive for leg swelling (trace edema. ). Negative for chest pain, palpitations and orthopnea. Gastrointestinal: Negative for abdominal pain (lower abdominal pain due to cramps and gaseousness. She had C.difficile. x 3 months. ), diarrhea, nausea and vomiting. Genitourinary: Negative for dysuria and flank pain. Musculoskeletal: Positive for back pain and falls (uses motorized wheel chair. ). Negative for myalgias and neck pain. Skin: Negative for rash. Neurological: Positive for tingling and weakness. Negative for sensory change, speech change, focal weakness and headaches. Endo/Heme/Allergies: Does not bruise/bleed easily. Psychiatric/Behavioral: Positive for depression. The patient is not nervous/anxious. PHYSICAL EXAM:     Recent Vitals:     Vitals:    11/13/17 1328   BP: (!) 175/82   Pulse: 84   Resp: 16     Body mass index is 23.62 kg/m². Physical Exam   Constitutional: She is oriented to person, place, and time. Vital signs are normal. She appears well-developed and well-nourished. No distress. HENT:   Head: Normocephalic and atraumatic. Eyes: Conjunctivae and EOM are normal. Pupils are equal, round, and reactive to light. No scleral icterus. Neck: Trachea normal and normal range of motion. Neck supple. No JVD present. Cardiovascular: Normal rate and regular rhythm. Occasional extrasystoles are present. Exam reveals decreased pulses. Murmur (2/6 systolic murmur. ) heard. 2 to 3 + leg edema bilateral.    Pulmonary/Chest: Effort normal and breath sounds normal. No respiratory distress. She has no wheezes. She has no rales. Abdominal: Soft. Bowel sounds are normal. She exhibits no distension. There is no tenderness. Musculoskeletal: She exhibits edema (trace edema of the legs now much improved from her history. ) and deformity (thoracic kyphosis,  absence of lumbar lordosis). Arms:  Lumbar movements of flexion, extension, lateral flexion and rotation are painful and restricted. SLR seated is painful bilaterally. Kyree's test not attempted. Sensations reduced over lower extremities. Neurological: She is alert and oriented to person, place, and time. She has normal reflexes. She is not disoriented. She displays atrophy. No sensory deficit. Gait abnormal.   Skin: Skin is warm, dry and intact. No rash noted. Psychiatric: She has a normal mood and affect. Her speech is normal and behavior is normal. Judgment and thought content normal. Cognition and memory are normal.   Nursing note and vitals reviewed. Ortho Exam      Assessment:     DIAGNOSIS:  1. Encounter for medication monitoring    2. Spinal stenosis, lumbar region, without neurogenic claudication    3. Postlaminectomy syndrome, lumbar region    4. Sacroiliitis, not elsewhere classified ([facility]    5. Lumbar radiculopathy, chronic      Her systemic symptoms were felt to be due to Pneumonia not due to pain medications. She has been stable on her current medications for several months to years. Treatment Plan:       Interventional Treatment:     No    Medical Necessity for Intervention:    See Chief complaint, HPI, Physical Examination. [x]The patient's questions were answered to the best of my abilities. Medical Management Plan: We will continue current pain medications. Current medications are being tolerated without any Adverse side effects. Goals for today's visit include to decrease pain to a lesser level, ability to engage in daily activities, decrease pain medication use, decrease health care utilization, muscle strengthening exercises.      Controlled Substances Monitoring: Attestation: The Prescription Monitoring Report was requested today but not available. (Renae Alexander MD)  Documentation: Possible medication side effects, risk of tolerance and/or dependence, and alternative treatments discussed., Obtaining appropriate analgesic effect of treatment., No signs of potential drug abuse or diversion identified., Existing medication contract. (she lives in an [facility]. ) (Renae Alexander MD)    Urine drug screens have been appropriate. Urine drug screen results:    OK. No aberrant activity noted. Analgesia is achieved. Activities of daily living are possible because of medications. Safe use of medications explained to patient. Counselling/Preventive measures for pain  Control:    [x]  Spine strengthening exercises are discussed with patient in detail. [x] Ill effects of being on chronic pain medications such as sleep disturbances, hormonal changes, withdrawal symptoms,  chronic opioid dependence and tolerance were discussed with patient. I had asked the patient to minimize medication use and utilize pain medications only for uncontrolled rest pain or pain with exertional activities. I advised patient not to self escalate pain medications without consulting with us. Patient had not been to any other pain clinic or ER since the last visit. Patient  does not complain of drowsiness and constipation occasionally from pain medications. Patient has taken medications as instructed and is satisfied with pain control. Patient denied illegal use of drugs and  is  currently enrolled in Physical Therapy or Psychological services. Patient does have questions or concerns. Patient's OARRS were reviewed. It is acceptable and appears patient is not receiving prescriptions from multiple prescribers.       At each of patient's future visits we will try to taper pain medications, while adjusting the adjunct medications, and re-evaluating for Physical Therapy to improve spinal and joint

## 2017-12-12 ENCOUNTER — OFFICE VISIT (OUTPATIENT)
Dept: PAIN MANAGEMENT | Age: 72
End: 2017-12-12
Payer: MEDICARE

## 2017-12-12 VITALS
RESPIRATION RATE: 15 BRPM | BODY MASS INDEX: 23.41 KG/M2 | DIASTOLIC BLOOD PRESSURE: 75 MMHG | WEIGHT: 124 LBS | HEIGHT: 61 IN | HEART RATE: 62 BPM | SYSTOLIC BLOOD PRESSURE: 138 MMHG

## 2017-12-12 DIAGNOSIS — M46.1 SACROILIITIS, NOT ELSEWHERE CLASSIFIED (HCC): ICD-10-CM

## 2017-12-12 DIAGNOSIS — Z51.81 ENCOUNTER FOR MEDICATION MONITORING: Chronic | ICD-10-CM

## 2017-12-12 DIAGNOSIS — M96.1 POSTLAMINECTOMY SYNDROME, LUMBAR REGION: Chronic | ICD-10-CM

## 2017-12-12 DIAGNOSIS — M54.16 LUMBAR RADICULOPATHY, CHRONIC: ICD-10-CM

## 2017-12-12 DIAGNOSIS — M48.061 SPINAL STENOSIS, LUMBAR REGION, WITHOUT NEUROGENIC CLAUDICATION: Primary | Chronic | ICD-10-CM

## 2017-12-12 PROCEDURE — 4004F PT TOBACCO SCREEN RCVD TLK: CPT | Performed by: NURSE PRACTITIONER

## 2017-12-12 PROCEDURE — G8420 CALC BMI NORM PARAMETERS: HCPCS | Performed by: NURSE PRACTITIONER

## 2017-12-12 PROCEDURE — 1090F PRES/ABSN URINE INCON ASSESS: CPT | Performed by: NURSE PRACTITIONER

## 2017-12-12 PROCEDURE — 3017F COLORECTAL CA SCREEN DOC REV: CPT | Performed by: NURSE PRACTITIONER

## 2017-12-12 PROCEDURE — 1123F ACP DISCUSS/DSCN MKR DOCD: CPT | Performed by: NURSE PRACTITIONER

## 2017-12-12 PROCEDURE — G8484 FLU IMMUNIZE NO ADMIN: HCPCS | Performed by: NURSE PRACTITIONER

## 2017-12-12 PROCEDURE — 4040F PNEUMOC VAC/ADMIN/RCVD: CPT | Performed by: NURSE PRACTITIONER

## 2017-12-12 PROCEDURE — 3014F SCREEN MAMMO DOC REV: CPT | Performed by: NURSE PRACTITIONER

## 2017-12-12 PROCEDURE — G8427 DOCREV CUR MEDS BY ELIG CLIN: HCPCS | Performed by: NURSE PRACTITIONER

## 2017-12-12 PROCEDURE — G8400 PT W/DXA NO RESULTS DOC: HCPCS | Performed by: NURSE PRACTITIONER

## 2017-12-12 PROCEDURE — 99214 OFFICE O/P EST MOD 30 MIN: CPT | Performed by: NURSE PRACTITIONER

## 2017-12-12 RX ORDER — OXYCODONE AND ACETAMINOPHEN 10; 325 MG/1; MG/1
1 TABLET ORAL EVERY 6 HOURS
Qty: 120 TABLET | Refills: 0 | Status: SHIPPED | OUTPATIENT
Start: 2017-12-14 | End: 2018-04-03 | Stop reason: SDUPTHER

## 2017-12-12 RX ORDER — FENTANYL 12 UG/H
1 PATCH TRANSDERMAL
Qty: 10 PATCH | Refills: 0 | Status: SHIPPED | OUTPATIENT
Start: 2018-01-13 | End: 2018-02-05 | Stop reason: SDUPTHER

## 2017-12-12 RX ORDER — OXYCODONE AND ACETAMINOPHEN 10; 325 MG/1; MG/1
1 TABLET ORAL EVERY 6 HOURS
Qty: 120 TABLET | Refills: 0 | Status: SHIPPED | OUTPATIENT
Start: 2018-01-13 | End: 2018-02-05 | Stop reason: SDUPTHER

## 2017-12-12 RX ORDER — FENTANYL 12 UG/H
1 PATCH TRANSDERMAL
Qty: 10 PATCH | Refills: 0 | Status: SHIPPED | OUTPATIENT
Start: 2017-12-14 | End: 2018-04-03 | Stop reason: SDUPTHER

## 2017-12-12 ASSESSMENT — ENCOUNTER SYMPTOMS
APNEA: 0
BACK PAIN: 1
VOMITING: 0
PHOTOPHOBIA: 0
ABDOMINAL PAIN: 0
SHORTNESS OF BREATH: 0
DIARRHEA: 0
COUGH: 0
CONSTIPATION: 0
NAUSEA: 0
BOWEL INCONTINENCE: 0
COLOR CHANGE: 0

## 2017-12-12 NOTE — PROGRESS NOTES
Subjective:      Patient ID: Tristan Badillo is a 67 y.o. female with chief complaint low back pain, leg, feet and hand pain of longstanding duration. SHe has severe osteoporosis and kyphoscoliosis along with severe degenerative disc disease. She is a patient at a nursing home so [facility]  review is not available. Medication administration record was reviewed and shows scheduled use of her medication. She had LESI per Dr Vicky Cutler 8/28/17 and 6/13/17. She was hospitalized in October for pneumonia. Back Pain   This is a chronic problem. The current episode started more than 1 year ago. The problem occurs constantly. The problem has been gradually worsening since onset. The pain is present in the lumbar spine and thoracic spine. The quality of the pain is described as aching, burning and stabbing. The pain radiates to the left foot and right knee. The pain is at a severity of 4/10. The pain is moderate. The symptoms are aggravated by lying down, sitting, standing and bending. Stiffness is present at night. Associated symptoms include leg pain, paresthesias, tingling and weakness. Pertinent negatives include no abdominal pain, bladder incontinence, bowel incontinence, chest pain, dysuria, fever, headaches, numbness or pelvic pain. Risk factors include lack of exercise. She has tried analgesics for the symptoms. The treatment provided moderate relief. Review of Systems   Constitutional: Negative for activity change, appetite change, fatigue, fever and unexpected weight change. HENT: Negative. Eyes: Negative for photophobia and visual disturbance. Respiratory: Negative for apnea, cough and shortness of breath. Cardiovascular: Negative for chest pain and leg swelling. Gastrointestinal: Negative for abdominal pain, bowel incontinence, constipation, diarrhea, nausea and vomiting. Endocrine: Negative.     Genitourinary: Negative for bladder incontinence, dysuria, flank pain, frequency, hematuria and

## 2018-02-05 DIAGNOSIS — M54.16 LUMBAR RADICULOPATHY: ICD-10-CM

## 2018-02-05 DIAGNOSIS — M46.1 SACROILIITIS, NOT ELSEWHERE CLASSIFIED (HCC): ICD-10-CM

## 2018-02-05 DIAGNOSIS — M96.1 POSTLAMINECTOMY SYNDROME, LUMBAR REGION: Chronic | ICD-10-CM

## 2018-02-05 DIAGNOSIS — M48.061 SPINAL STENOSIS, LUMBAR REGION, WITHOUT NEUROGENIC CLAUDICATION: Chronic | ICD-10-CM

## 2018-02-05 DIAGNOSIS — M54.16 LUMBAR RADICULOPATHY, CHRONIC: ICD-10-CM

## 2018-02-05 DIAGNOSIS — Z51.81 ENCOUNTER FOR MEDICATION MONITORING: Chronic | ICD-10-CM

## 2018-02-05 NOTE — TELEPHONE ENCOUNTER
Patient will be picking up prescriptions tomorrow due to Dr. Grant Mancuso being out of office. I have medications pended for you.   All RX'S NEED PRINTED

## 2018-02-06 RX ORDER — GABAPENTIN 600 MG/1
600 TABLET ORAL 3 TIMES DAILY
Qty: 90 TABLET | Refills: 3 | Status: SHIPPED | OUTPATIENT
Start: 2018-02-06 | End: 2018-11-14 | Stop reason: ALTCHOICE

## 2018-02-06 RX ORDER — OXYCODONE AND ACETAMINOPHEN 10; 325 MG/1; MG/1
1 TABLET ORAL EVERY 6 HOURS
Qty: 120 TABLET | Refills: 0 | Status: SHIPPED | OUTPATIENT
Start: 2018-02-12 | End: 2018-03-13 | Stop reason: SDUPTHER

## 2018-02-06 RX ORDER — FENTANYL 12 UG/H
1 PATCH TRANSDERMAL
Qty: 10 PATCH | Refills: 0 | Status: SHIPPED | OUTPATIENT
Start: 2018-02-12 | End: 2018-03-22 | Stop reason: SDUPTHER

## 2018-02-06 RX ORDER — LIDOCAINE 40 MG/G
CREAM TOPICAL
Qty: 45 G | Refills: 5 | Status: SHIPPED | OUTPATIENT
Start: 2018-02-06 | End: 2018-04-09 | Stop reason: SDUPTHER

## 2018-02-06 RX ORDER — METHOCARBAMOL 750 MG/1
750 TABLET, FILM COATED ORAL 2 TIMES DAILY PRN
Qty: 60 TABLET | Refills: 2 | Status: SHIPPED | OUTPATIENT
Start: 2018-02-06 | End: 2018-04-09 | Stop reason: SDUPTHER

## 2018-03-13 ENCOUNTER — OFFICE VISIT (OUTPATIENT)
Dept: PAIN MANAGEMENT | Age: 73
End: 2018-03-13
Payer: MEDICARE

## 2018-03-13 VITALS
HEIGHT: 61 IN | BODY MASS INDEX: 24.73 KG/M2 | HEART RATE: 69 BPM | WEIGHT: 131 LBS | SYSTOLIC BLOOD PRESSURE: 148 MMHG | DIASTOLIC BLOOD PRESSURE: 70 MMHG | RESPIRATION RATE: 12 BRPM | OXYGEN SATURATION: 92 %

## 2018-03-13 DIAGNOSIS — M96.1 POSTLAMINECTOMY SYNDROME, LUMBAR REGION: ICD-10-CM

## 2018-03-13 DIAGNOSIS — M47.26 OTHER SPONDYLOSIS WITH RADICULOPATHY, LUMBAR REGION: ICD-10-CM

## 2018-03-13 DIAGNOSIS — M54.16 LUMBAR RADICULOPATHY, CHRONIC: ICD-10-CM

## 2018-03-13 DIAGNOSIS — M48.061 SPINAL STENOSIS, LUMBAR REGION, WITHOUT NEUROGENIC CLAUDICATION: Primary | ICD-10-CM

## 2018-03-13 DIAGNOSIS — Z51.81 ENCOUNTER FOR MEDICATION MONITORING: ICD-10-CM

## 2018-03-13 DIAGNOSIS — M46.1 SACROILIITIS, NOT ELSEWHERE CLASSIFIED (HCC): ICD-10-CM

## 2018-03-13 PROCEDURE — 4040F PNEUMOC VAC/ADMIN/RCVD: CPT | Performed by: INTERNAL MEDICINE

## 2018-03-13 PROCEDURE — 1123F ACP DISCUSS/DSCN MKR DOCD: CPT | Performed by: INTERNAL MEDICINE

## 2018-03-13 PROCEDURE — 1090F PRES/ABSN URINE INCON ASSESS: CPT | Performed by: INTERNAL MEDICINE

## 2018-03-13 PROCEDURE — 99214 OFFICE O/P EST MOD 30 MIN: CPT | Performed by: INTERNAL MEDICINE

## 2018-03-13 PROCEDURE — G8400 PT W/DXA NO RESULTS DOC: HCPCS | Performed by: INTERNAL MEDICINE

## 2018-03-13 PROCEDURE — G8427 DOCREV CUR MEDS BY ELIG CLIN: HCPCS | Performed by: INTERNAL MEDICINE

## 2018-03-13 PROCEDURE — 3014F SCREEN MAMMO DOC REV: CPT | Performed by: INTERNAL MEDICINE

## 2018-03-13 PROCEDURE — 3017F COLORECTAL CA SCREEN DOC REV: CPT | Performed by: INTERNAL MEDICINE

## 2018-03-13 PROCEDURE — G8484 FLU IMMUNIZE NO ADMIN: HCPCS | Performed by: INTERNAL MEDICINE

## 2018-03-13 PROCEDURE — 4004F PT TOBACCO SCREEN RCVD TLK: CPT | Performed by: INTERNAL MEDICINE

## 2018-03-13 PROCEDURE — G8420 CALC BMI NORM PARAMETERS: HCPCS | Performed by: INTERNAL MEDICINE

## 2018-03-13 RX ORDER — OXYCODONE AND ACETAMINOPHEN 10; 325 MG/1; MG/1
1 TABLET ORAL EVERY 6 HOURS
Qty: 120 TABLET | Refills: 0 | Status: SHIPPED | OUTPATIENT
Start: 2018-03-13 | End: 2018-04-09 | Stop reason: SDUPTHER

## 2018-03-13 ASSESSMENT — ENCOUNTER SYMPTOMS
BLURRED VISION: 0
EYES NEGATIVE: 1
SHORTNESS OF BREATH: 0
GASTROINTESTINAL NEGATIVE: 1
BACK PAIN: 1
ABDOMINAL PAIN: 0
COUGH: 1

## 2018-03-13 NOTE — PROGRESS NOTES
[facility] PHYSICIANS  [facility] PAIN MANAGEMENT [address] [facility] [address]  Dept: [phone]  Dept Fax: [phone]    Dr. Efrain Hopper    Progress Note    Date of patient's visit: 3/13/2018  YOB: 1945  Patient's Name:  Miles Nelson    Patient ID: Miles Nelson is 67 y.o. caucasianfemale, with  Lower Back Pain  . SHE STATES HER BACK PAIN IS WORSE THAN USUAL. She also appears ill due to nausea and vomited in the waiting room. She appeared somewhat drowsy. She is on gabapentin and on Seroquel. Perhaps the seroquel dose can be decreased. Chief Complaint:   Chief Complaint   Patient presents with    Lower Back Pain          She referred by her pcp for evaluation and treatment of chronic low back pain. This is evaluated as a personal injury. The patient first noted symptoms several years ago. It was not related to no known injury. The pain is rated 6 /10, moderate, and is located at the right lumbar area, left sacroiliac area, waist, right gluteal area, left gluteal area, across the lower back or radiating to bilateral leg(s). The pain is described as aching, sharp, stabbing or radiating to bilateral legs and occurs upon awakening, in the morning, in the afternoon, in the evening, at bedtime and during sleep. The symptoms are not progressive. Symptoms are exacerbated by coughing, straining, extension, standing, lifting, twisting and exercise. Factors which relieve the pain include change in body position, ice, NSAIDs (otc), Tylenol, rest and sleep and taking prescribed medications. Other associated symptoms include weakness in the right leg, weakness in the left leg, tingling in the right leg and tingling in the left leg. Previous history of symptoms: the problem is long-standing.    Treatment efforts have included rest, ice, OTC NSAIDS, prescription NSAIDS, acetaminophen, muscle relaxers, PT and home exercises, with and without needed for Chest pain up to max of 3 total doses. If no relief after 1 dose, call 911.  oxyCODONE-acetaminophen (PERCOCET)  MG per tablet Take 1 tablet by mouth every 6 hours as needed for Pain . Earliest Fill Date: 8/15/17 20 tablet 0    Calcium Carbonate-Vitamin D (CALCIUM-VITAMIN D) 500-200 MG-UNIT per tablet Take 1 tablet by mouth 2 times daily (with meals) Indications: 600MG-400      ferrous sulfate 325 (65 Fe) MG tablet Take 325 mg by mouth daily (with breakfast)      levETIRAcetam (KEPPRA) 750 MG tablet Take 750 mg by mouth 2 times daily      polyethylene glycol (GLYCOLAX) packet Take 17 g by mouth daily as needed for Constipation      Simethicone 40 MG/0.6ML LIQD Take 125 mg by mouth      lidocaine (XYLOCAINE) 5 % ointment Apply 5 g topically 3 times daily Apply topically as needed. 90 g 3    b complex vitamins capsule Take 1 capsule by mouth daily      carbamide peroxide (DEBROX) 6.5 % otic solution 2 drops 2 times daily      phenytoin (DILANTIN) 100 MG ER capsule Take 100 mg by mouth 2 times daily      loratadine (CLARITIN) 10 MG capsule Take 10 mg by mouth daily      promethazine (PHENERGAN) 25 MG tablet Take 25 mg by mouth every 6 hours as needed for Nausea      amLODIPine (NORVASC) 2.5 MG tablet Take 5 mg by mouth daily       acetaminophen (TYLENOL) 500 MG tablet Take 500 mg by mouth every 6 hours as needed for Pain      diphenhydrAMINE (BENADRYL) 25 MG tablet Take 25 mg by mouth every 6 hours as needed for Itching      loperamide (IMODIUM) 2 MG capsule Take 2 mg by mouth 4 times daily as needed for Diarrhea      risperiDONE (RISPERDAL) 0.25 MG tablet Take 0.25 mg by mouth 2 times daily      metoprolol (TOPROL-XL) 25 MG XL tablet Take 25 mg by mouth daily.  hydrocortisone (CORTEF) 5 MG tablet Take 5 mg by mouth daily.  aspirin 81 MG tablet Take 81 mg by mouth daily.       furosemide (LASIX) 20 MG tablet Take 40 mg by mouth daily       LORazepam (ATIVAN) 0.5 MG hydrocortisone daily    Anemia     Anxiety     Anxiety     Atrial fibrillation (HCC)     CAD (coronary artery disease)     Cellulitis     Cerebral vascular disease     CHF (congestive heart failure) (HCC)     Chronic kidney disease     Colon cancer (HCC)     Depression     Fibromyalgia     Fracture     right foot, no surgery    Heart disease     History of blood transfusion     Hyperlipidemia     Hypertension     Hypokalemia     Lumbosacral spondylosis without myelopathy     Myalgia and myositis, unspecified     Myocardial infarct     Opioid type dependence, continuous (HCC)     Osteoarthritis of ankle, left     Osteoarthritis of both hips     Osteoarthritis of both knees     Osteoporosis     Peripheral neuropathy (HCC)     Peripheral vascular disease (HCC)     Polyneuropathy (HCC)     Post traumatic stress disorder (PTSD)     Postlaminectomy syndrome, lumbar region     Thyrotoxic crisis     Tremors of nervous system        Past Surgical History:     Past Surgical History:   Procedure Laterality Date    ABDOMEN SURGERY      bowel resection    ANGIOPLASTY      LCx, Prox    APPENDECTOMY      BACK SURGERY      CARDIAC SURGERY      2 heart stents    CAROTID ENDARTERECTOMY Bilateral      SECTION      x1    COLECTOMY  2009    EYE SURGERY      eye lids    FRACTURE SURGERY      left ankle    HYSTERECTOMY      LUMBAR FUSION  2006    OVARY REMOVAL      TONSILLECTOMY         Family History:     Family History   Problem Relation Age of Onset    Heart Disease Mother     Diabetes Mother     Other Mother      lung disease       Social History:      TOBACCO:   reports that she has been smoking. She has a 75.00 pack-year smoking history. She has never used smokeless tobacco.  ETOH:   reports that she does not drink alcohol. REVIEW OF SYSTEMS:     Review of Systems   Constitutional: Negative for chills, fever and weight loss. HENT: Negative. Eyes: Negative.   Negative for blurred vision. Respiratory: Positive for cough. Negative for shortness of breath. Cardiovascular: Positive for leg swelling. Negative for chest pain, palpitations and claudication. Gastrointestinal: Negative. Negative for abdominal pain. Genitourinary: Negative. Negative for hematuria. Musculoskeletal: Positive for back pain, falls, joint pain and neck pain. Negative for myalgias. Skin: Negative. Negative for rash. Neurological: Positive for tingling. Negative for weakness and headaches. Endo/Heme/Allergies: Negative. Negative for polydipsia. Psychiatric/Behavioral: Positive for depression. Negative for substance abuse and suicidal ideas. The patient is nervous/anxious. The patient does not have insomnia. I have reviewed Patient's Past medical history, Social History, Past surgical History, Family History and  Medications. PHYSICAL EXAM:     Recent Vitals:     Vitals:    03/13/18 1253   BP: (!) 148/70   Pulse: 69   Resp: 12   SpO2: 92%     Body mass index is 24.77 kg/m². Physical Exam   Constitutional: She is oriented to person, place, and time. She appears well-developed and well-nourished. HENT:   Head: Normocephalic and atraumatic. Eyes: EOM are normal. Pupils are equal, round, and reactive to light. Neck: Normal range of motion. Neck supple. No JVD present. No thyromegaly present. Cardiovascular: Normal rate, regular rhythm, intact distal pulses and normal pulses. Murmur heard. Systolic murmur is present with a grade of 2/6   Pulmonary/Chest: Effort normal and breath sounds normal. No respiratory distress. She has no rales. She exhibits no tenderness. Abdominal: Soft. Bowel sounds are normal. She exhibits no distension and no mass. There is no tenderness. There is no rebound and no guarding. Musculoskeletal: She exhibits no edema. Lumbar back: She exhibits decreased range of motion, tenderness, bony tenderness, pain and spasm.  She exhibits no swelling, no edema, no deformity and no laceration. Inspection of the lumbar spine shows the spine to be in midline without kypho-scoliosis. Palpation shows tenderness over lumbar facet joints and para lumbar muscles. Range of movements showed pain and restricted movements with hyper-extension of the lumbar spine relieved by forward flexion. Similar restricted and painful movements noted with lateral rotation and lateral flexion at the Lumbar spine. DTRs are normal bilaterally. No sensory deficits are noted. Straight leg raising test is negative bilaterally. Patient had  prior Lumbar Spine surgery. Lymphadenopathy:     She has no cervical adenopathy. Neurological: She is alert and oriented to person, place, and time. She has normal reflexes. No cranial nerve deficit. Coordination normal.   Skin: Skin is warm and dry. No rash noted. She is not diaphoretic. No erythema. No pallor. Psychiatric: She has a normal mood and affect. Her speech is normal and behavior is normal. Judgment and thought content normal. Cognition and memory are normal.     Ortho Exam      Assessment:     DIAGNOSIS:  1. Spinal stenosis, lumbar region, without neurogenic claudication    2. Postlaminectomy syndrome, lumbar region    3. Lumbar radiculopathy, chronic    4. Sacroiliitis, not elsewhere classified ([facility] [address]    5. Encounter for medication monitoring    6. Other spondylosis with radiculopathy, lumbar region           Treatment Plan:       Interventional Treatment:     Yes. Epidural Steroid Injection:     The patient has pain that fulfills the following criteria: 1. Suspected radicular pain and  neurogenic claudication  2. Low back pain with imaging abnormalities such as degenerative disc disease, central     spinal stenosis, facet arthropathy, ligamentum flavum thickening, displacement of      intervertebral disc.   3.Visual analog scale greater or equal to 3/10( moderate to severe pain) with functional      impairment in activities of daily living. 4. Patient failed four weeks of non surgical, non injection care. Exception to the four week wait, beginning at the onset of pain, before receiving Epidural steroid injections exists: 1. These include moderate pain with significant functional loss at work and or home. 2. Severe pain unresponsive to traditional outpatient medical management   3. Inability to tolerate non- surgical non-injection care due to co existing medical     conditions. 4. Prior successful Epidural steroid injection for same specific condition  '    Medical Necessity for Intervention:    See Chief complaint, HPI, Physical Examination. [x]The patient's questions were answered to the best of my abilities. Orders Placed This Encounter   Procedures    Urine Drug Screen    PT range of motion     Range of movements, gait stability, stand pivot and transfer in and out of bed. Dx: spinal stenosis. Lumbar spondylosis. Post laminectomy. Standing Status:   Future     Standing Expiration Date:   4/13/2018     Scheduling Instructions:      improve gait and strength    OH INJECT ANES/STEROID FORAMEN LUMBAR/SACRAL W IMG GUIDE ,1 LEVEL       Orders Placed This Encounter   Medications    oxyCODONE-acetaminophen (PERCOCET)  MG per tablet     Sig: Take 1 tablet by mouth every 6 hours for 30 days Please give up to 4 AM, 10 AM, 4 PM, 10 PM.     Dispense:  120 tablet     Refill:  0         Medical Management Plan: We will continue current pain medications. Current medications are being tolerated without any Adverse side effects. Goals for today's visit include to decrease pain to a lesser level, ability to engage in daily activities, decrease pain medication use, decrease health care utilization, muscle strengthening exercises. Controlled Substances Monitoring:        Urine drug screens have been appropriate. Urine drug screen results:    OK. No aberrant activity noted.   Analgesia is achieved. Activities of daily living are possible because of medications. Safe use of medications explained to patient. Counselling/Preventive measures for pain  Control:    [x]  Spine strengthening exercises are discussed with patient in detail. [x] Ill effects of being on chronic pain medications such as sleep disturbances, hormonal changes, withdrawal symptoms,  chronic opioid dependence and tolerance were discussed with patient. I had asked the patient to minimize medication use and utilize pain medications only for uncontrolled rest pain or pain with exertional activities. I advised patient not to self escalate pain medications without consulting with us. Patient had not been to any other pain clinic or ER since the last visit. Patient  does not complain of drowsiness and constipation occasionally from pain medications. Patient has taken medications as instructed and is satisfied with pain control. Patient denied illegal use of drugs and  is not currently enrolled in Physical Therapy or Psychological services. Patient does have questions or concerns. Patient's OARRS were reviewed. It is acceptable and appears patient is not receiving prescriptions from multiple prescribers. At each of patient's future visits we will try to taper pain medications, while adjusting the adjunct medications, and re-evaluating for Physical Therapy to improve spinal and joint strength. We will continue to have discussions to decrease pain medications as tolerated. We discussed the same at today's visit and have not been to implement it, as the patient's pain is somewhat under control with current medications. Orders Placed This Encounter   Procedures    Urine Drug Screen    PT range of motion     Range of movements, gait stability, stand pivot and transfer in and out of bed. Dx: spinal stenosis. Lumbar spondylosis. Post laminectomy.      Standing Status:   Future     Standing Expiration Date:   4/13/2018     Scheduling Instructions:      improve gait and strength    IN INJECT ANES/STEROID FORAMEN LUMBAR/SACRAL W IMG GUIDE ,1 LEVEL       Decision Making Process : Patient's health history and referral records thoroughly reviewed before focused physical examination and discussion with patient. Over 50% of today's visit is spent on examining the patient and counseling. Level of complexity of date to be reviewed is Moderate. The chart date reviewed include the following: Imaging Reports. Summary of Care. Time spent reviewing with patient the below reports:   Medication safety, Treatment options. Level of diagnosis and management options of this case is multiple: involving the following management options: Interventions as needed, medication management as appropriate, future visits, activity modification, heat/ice as needed, Urine drug screen as required. Return in  4 weeks with Jobe Ormond M.D.  for Re-evaluation and further plan of treatment. Rosio Heller M.D. Orders Placed This Encounter   Procedures    Urine Drug Screen    PT range of motion     Range of movements, gait stability, stand pivot and transfer in and out of bed. Dx: spinal stenosis. Lumbar spondylosis. Post laminectomy.      Standing Status:   Future     Standing Expiration Date:   4/13/2018     Scheduling Instructions:      improve gait and strength    IN INJECT ANES/STEROID FORAMEN LUMBAR/SACRAL W IMG GUIDE ,1 LEVEL         Electronically signed by Breana Haney MD on 3/13/2018 at 2:34 PM

## 2018-03-21 ENCOUNTER — TELEPHONE (OUTPATIENT)
Dept: PAIN MANAGEMENT | Age: 73
End: 2018-03-21

## 2018-03-22 DIAGNOSIS — M48.061 SPINAL STENOSIS, LUMBAR REGION, WITHOUT NEUROGENIC CLAUDICATION: Chronic | ICD-10-CM

## 2018-03-22 DIAGNOSIS — M46.1 SACROILIITIS, NOT ELSEWHERE CLASSIFIED (HCC): ICD-10-CM

## 2018-03-22 DIAGNOSIS — Z51.81 ENCOUNTER FOR MEDICATION MONITORING: Chronic | ICD-10-CM

## 2018-03-22 DIAGNOSIS — M96.1 POSTLAMINECTOMY SYNDROME, LUMBAR REGION: Chronic | ICD-10-CM

## 2018-03-22 DIAGNOSIS — M54.16 LUMBAR RADICULOPATHY, CHRONIC: ICD-10-CM

## 2018-03-22 RX ORDER — FENTANYL 12 UG/H
1 PATCH TRANSDERMAL
Qty: 10 PATCH | Refills: 0 | Status: SHIPPED | OUTPATIENT
Start: 2018-03-22 | End: 2018-04-09 | Stop reason: SDUPTHER

## 2018-03-22 NOTE — TELEPHONE ENCOUNTER
Treva Jessica is requesting a refill on the following medications:   Requested Prescriptions     Pending Prescriptions Disp Refills    fentaNYL (DURAGESIC) 12 MCG/HR 10 patch 0     Sig: Place 1 patch onto the skin every 72 hours for 30 days. Last OV 3/13/18    Future Appointments  Date Time Provider Conchita Elda   3/23/2018 10:40 AM Dianne Zaragoza MD [facility] None   4/9/2018 1:00 PM Robert Maldonado MD Pain [facility]       OARRS report sent to Dr. Su Flores through alternative route for review.

## 2018-03-22 NOTE — TELEPHONE ENCOUNTER
Printed one and it is at  in [facility] office. I called Nursing home as they are calling here.      Electronically signed by Robert Maldonado MD on 3/22/2018 at 5:02 PM

## 2018-03-23 ENCOUNTER — HOSPITAL ENCOUNTER (OUTPATIENT)
Dept: PAIN MANAGEMENT | Age: 73
Discharge: HOME | End: 2018-03-23
Payer: MEDICARE

## 2018-03-23 DIAGNOSIS — M96.1 POSTLAMINECTOMY SYNDROME, LUMBAR REGION: ICD-10-CM

## 2018-03-23 DIAGNOSIS — M48.061 SPINAL STENOSIS, LUMBAR REGION, WITHOUT NEUROGENIC CLAUDICATION: ICD-10-CM

## 2018-03-23 DIAGNOSIS — M54.16 LUMBAR RADICULOPATHY, CHRONIC: Primary | ICD-10-CM

## 2018-04-03 ENCOUNTER — HOSPITAL ENCOUNTER (OUTPATIENT)
Dept: GENERAL RADIOLOGY | Age: 73
Discharge: HOME | End: 2018-04-05
Payer: MEDICARE

## 2018-04-03 ENCOUNTER — HOSPITAL ENCOUNTER (OUTPATIENT)
Dept: PAIN MANAGEMENT | Age: 73
Discharge: HOME | End: 2018-04-03
Payer: MEDICARE

## 2018-04-03 VITALS
RESPIRATION RATE: 16 BRPM | HEIGHT: 60 IN | TEMPERATURE: 98.3 F | SYSTOLIC BLOOD PRESSURE: 142 MMHG | DIASTOLIC BLOOD PRESSURE: 70 MMHG | WEIGHT: 131 LBS | OXYGEN SATURATION: 100 % | HEART RATE: 54 BPM | BODY MASS INDEX: 25.72 KG/M2

## 2018-04-03 DIAGNOSIS — M96.1 POSTLAMINECTOMY SYNDROME, LUMBAR REGION: ICD-10-CM

## 2018-04-03 DIAGNOSIS — M48.061 SPINAL STENOSIS, LUMBAR REGION, WITHOUT NEUROGENIC CLAUDICATION: ICD-10-CM

## 2018-04-03 DIAGNOSIS — M54.16 LUMBAR RADICULOPATHY, CHRONIC: ICD-10-CM

## 2018-04-03 DIAGNOSIS — M54.16 LUMBAR RADICULOPATHY, CHRONIC: Primary | ICD-10-CM

## 2018-04-03 PROCEDURE — 62327 NJX INTERLAMINAR LMBR/SAC: CPT

## 2018-04-03 PROCEDURE — 3209999900 FLUORO FOR SURGICAL PROCEDURES

## 2018-04-03 PROCEDURE — 6360000004 HC RX CONTRAST MEDICATION

## 2018-04-03 PROCEDURE — 62323 NJX INTERLAMINAR LMBR/SAC: CPT | Performed by: PAIN MEDICINE

## 2018-04-03 PROCEDURE — 6360000002 HC RX W HCPCS

## 2018-04-03 RX ORDER — POTASSIUM CHLORIDE 1.5 G/1.77G
20 POWDER, FOR SOLUTION ORAL DAILY
COMMUNITY
End: 2018-10-22

## 2018-04-03 RX ORDER — ALBUTEROL SULFATE 2.5 MG/3ML
2.5 SOLUTION RESPIRATORY (INHALATION) EVERY 4 HOURS PRN
COMMUNITY
End: 2018-10-22

## 2018-04-03 ASSESSMENT — PAIN SCALES - GENERAL
PAINLEVEL_OUTOF10: 7
PAINLEVEL_OUTOF10: 7

## 2018-04-03 ASSESSMENT — PAIN DESCRIPTION - LOCATION: LOCATION: BACK;LEG

## 2018-04-03 ASSESSMENT — PAIN DESCRIPTION - FREQUENCY: FREQUENCY: CONTINUOUS

## 2018-04-03 ASSESSMENT — PAIN DESCRIPTION - ORIENTATION: ORIENTATION: LEFT

## 2018-04-03 ASSESSMENT — PAIN DESCRIPTION - PROGRESSION: CLINICAL_PROGRESSION: GRADUALLY WORSENING

## 2018-04-03 ASSESSMENT — PAIN DESCRIPTION - ONSET: ONSET: ON-GOING

## 2018-04-03 ASSESSMENT — PAIN DESCRIPTION - DIRECTION: RADIATING_TOWARDS: LEFT LEG

## 2018-04-03 ASSESSMENT — PAIN DESCRIPTION - PAIN TYPE: TYPE: CHRONIC PAIN

## 2018-04-09 ENCOUNTER — OFFICE VISIT (OUTPATIENT)
Dept: PAIN MANAGEMENT | Age: 73
End: 2018-04-09
Payer: MEDICARE

## 2018-04-09 VITALS
DIASTOLIC BLOOD PRESSURE: 72 MMHG | OXYGEN SATURATION: 94 % | BODY MASS INDEX: 25.72 KG/M2 | HEART RATE: 63 BPM | SYSTOLIC BLOOD PRESSURE: 119 MMHG | RESPIRATION RATE: 14 BRPM | HEIGHT: 60 IN | WEIGHT: 131 LBS

## 2018-04-09 DIAGNOSIS — M48.061 SPINAL STENOSIS, LUMBAR REGION, WITHOUT NEUROGENIC CLAUDICATION: ICD-10-CM

## 2018-04-09 DIAGNOSIS — M54.16 LUMBAR RADICULOPATHY, CHRONIC: ICD-10-CM

## 2018-04-09 DIAGNOSIS — M96.1 POSTLAMINECTOMY SYNDROME, LUMBAR REGION: ICD-10-CM

## 2018-04-09 DIAGNOSIS — M46.1 SACROILIITIS, NOT ELSEWHERE CLASSIFIED (HCC): ICD-10-CM

## 2018-04-09 DIAGNOSIS — Z51.81 ENCOUNTER FOR MEDICATION MONITORING: ICD-10-CM

## 2018-04-09 PROCEDURE — 1123F ACP DISCUSS/DSCN MKR DOCD: CPT | Performed by: INTERNAL MEDICINE

## 2018-04-09 PROCEDURE — 99214 OFFICE O/P EST MOD 30 MIN: CPT | Performed by: INTERNAL MEDICINE

## 2018-04-09 PROCEDURE — 4040F PNEUMOC VAC/ADMIN/RCVD: CPT | Performed by: INTERNAL MEDICINE

## 2018-04-09 PROCEDURE — G8417 CALC BMI ABV UP PARAM F/U: HCPCS | Performed by: INTERNAL MEDICINE

## 2018-04-09 PROCEDURE — 1036F TOBACCO NON-USER: CPT | Performed by: INTERNAL MEDICINE

## 2018-04-09 PROCEDURE — G8400 PT W/DXA NO RESULTS DOC: HCPCS | Performed by: INTERNAL MEDICINE

## 2018-04-09 PROCEDURE — 1090F PRES/ABSN URINE INCON ASSESS: CPT | Performed by: INTERNAL MEDICINE

## 2018-04-09 PROCEDURE — 3017F COLORECTAL CA SCREEN DOC REV: CPT | Performed by: INTERNAL MEDICINE

## 2018-04-09 PROCEDURE — G8427 DOCREV CUR MEDS BY ELIG CLIN: HCPCS | Performed by: INTERNAL MEDICINE

## 2018-04-09 PROCEDURE — 3014F SCREEN MAMMO DOC REV: CPT | Performed by: INTERNAL MEDICINE

## 2018-04-09 RX ORDER — FENTANYL 12 UG/H
1 PATCH TRANSDERMAL
Qty: 10 PATCH | Refills: 0 | Status: SHIPPED | OUTPATIENT
Start: 2018-04-20 | End: 2018-10-29

## 2018-04-09 RX ORDER — LIDOCAINE 40 MG/G
CREAM TOPICAL
Qty: 45 G | Refills: 5 | Status: SHIPPED | OUTPATIENT
Start: 2018-04-09 | End: 2018-04-09 | Stop reason: SDUPTHER

## 2018-04-09 RX ORDER — LIDOCAINE 40 MG/G
CREAM TOPICAL
Qty: 133 G | Refills: 3 | Status: SHIPPED | OUTPATIENT
Start: 2018-04-09

## 2018-04-09 RX ORDER — METHOCARBAMOL 750 MG/1
750 TABLET, FILM COATED ORAL 2 TIMES DAILY PRN
Qty: 60 TABLET | Refills: 5 | Status: SHIPPED | OUTPATIENT
Start: 2018-04-09 | End: 2018-04-09 | Stop reason: SDUPTHER

## 2018-04-09 RX ORDER — METHOCARBAMOL 750 MG/1
750 TABLET, FILM COATED ORAL 2 TIMES DAILY PRN
Qty: 60 TABLET | Refills: 5 | Status: SHIPPED | OUTPATIENT
Start: 2018-04-09 | End: 2018-11-14

## 2018-04-09 RX ORDER — LIDOCAINE 40 MG/G
CREAM TOPICAL
Qty: 133 G | Refills: 3 | Status: SHIPPED | OUTPATIENT
Start: 2018-04-09 | End: 2018-04-09 | Stop reason: SDUPTHER

## 2018-04-09 RX ORDER — OXYCODONE AND ACETAMINOPHEN 10; 325 MG/1; MG/1
1 TABLET ORAL EVERY 6 HOURS
Qty: 120 TABLET | Refills: 0 | Status: SHIPPED | OUTPATIENT
Start: 2018-04-12 | End: 2018-04-30 | Stop reason: SDUPTHER

## 2018-04-09 ASSESSMENT — ENCOUNTER SYMPTOMS
SHORTNESS OF BREATH: 0
EYES NEGATIVE: 1
NAUSEA: 0
HEARTBURN: 0
COUGH: 0
RESPIRATORY NEGATIVE: 1
ABDOMINAL PAIN: 0
BLURRED VISION: 0
CONSTIPATION: 1
BACK PAIN: 1

## 2018-04-30 DIAGNOSIS — M54.16 LUMBAR RADICULOPATHY, CHRONIC: ICD-10-CM

## 2018-04-30 DIAGNOSIS — M46.1 SACROILIITIS, NOT ELSEWHERE CLASSIFIED (HCC): ICD-10-CM

## 2018-04-30 DIAGNOSIS — M96.1 POSTLAMINECTOMY SYNDROME, LUMBAR REGION: ICD-10-CM

## 2018-04-30 DIAGNOSIS — M48.061 SPINAL STENOSIS, LUMBAR REGION, WITHOUT NEUROGENIC CLAUDICATION: ICD-10-CM

## 2018-04-30 DIAGNOSIS — Z51.81 ENCOUNTER FOR MEDICATION MONITORING: ICD-10-CM

## 2018-04-30 RX ORDER — OXYCODONE AND ACETAMINOPHEN 10; 325 MG/1; MG/1
1 TABLET ORAL EVERY 6 HOURS
Qty: 120 TABLET | Refills: 0 | Status: SHIPPED | OUTPATIENT
Start: 2018-05-11 | End: 2018-05-02 | Stop reason: SDUPTHER

## 2018-05-02 DIAGNOSIS — M54.16 LUMBAR RADICULOPATHY, CHRONIC: ICD-10-CM

## 2018-05-02 DIAGNOSIS — M48.061 SPINAL STENOSIS, LUMBAR REGION, WITHOUT NEUROGENIC CLAUDICATION: ICD-10-CM

## 2018-05-02 DIAGNOSIS — Z51.81 ENCOUNTER FOR MEDICATION MONITORING: ICD-10-CM

## 2018-05-02 DIAGNOSIS — M46.1 SACROILIITIS, NOT ELSEWHERE CLASSIFIED (HCC): ICD-10-CM

## 2018-05-02 DIAGNOSIS — M96.1 POSTLAMINECTOMY SYNDROME, LUMBAR REGION: ICD-10-CM

## 2018-05-02 RX ORDER — OXYCODONE AND ACETAMINOPHEN 10; 325 MG/1; MG/1
1 TABLET ORAL EVERY 6 HOURS
Qty: 120 TABLET | Refills: 0 | Status: SHIPPED | OUTPATIENT
Start: 2018-06-10 | End: 2018-11-14

## 2018-10-22 ENCOUNTER — HOSPITAL ENCOUNTER (OUTPATIENT)
Dept: PAIN MANAGEMENT | Age: 73
Discharge: HOME | End: 2018-10-22
Payer: MEDICARE

## 2018-10-22 VITALS
TEMPERATURE: 98.4 F | OXYGEN SATURATION: 93 % | HEIGHT: 60 IN | BODY MASS INDEX: 25.52 KG/M2 | DIASTOLIC BLOOD PRESSURE: 66 MMHG | RESPIRATION RATE: 16 BRPM | WEIGHT: 130 LBS | HEART RATE: 68 BPM | SYSTOLIC BLOOD PRESSURE: 168 MMHG

## 2018-10-22 DIAGNOSIS — M46.1 SACROILIITIS, NOT ELSEWHERE CLASSIFIED (HCC): ICD-10-CM

## 2018-10-22 DIAGNOSIS — M18.11 ARTHRITIS OF CARPOMETACARPAL (CMC) JOINT OF RIGHT THUMB: Primary | ICD-10-CM

## 2018-10-22 DIAGNOSIS — M77.8 TENDINITIS OF THUMB: ICD-10-CM

## 2018-10-22 DIAGNOSIS — M54.16 LUMBAR RADICULOPATHY, CHRONIC: ICD-10-CM

## 2018-10-22 DIAGNOSIS — M48.061 SPINAL STENOSIS, LUMBAR REGION, WITHOUT NEUROGENIC CLAUDICATION: ICD-10-CM

## 2018-10-22 DIAGNOSIS — M96.1 POSTLAMINECTOMY SYNDROME, LUMBAR REGION: ICD-10-CM

## 2018-10-22 PROCEDURE — 99213 OFFICE O/P EST LOW 20 MIN: CPT

## 2018-10-22 PROCEDURE — 99214 OFFICE O/P EST MOD 30 MIN: CPT | Performed by: PAIN MEDICINE

## 2018-10-22 ASSESSMENT — ENCOUNTER SYMPTOMS
COUGH: 0
SHORTNESS OF BREATH: 0
BACK PAIN: 1
CHEST TIGHTNESS: 0
PHOTOPHOBIA: 0
RESPIRATORY NEGATIVE: 1
NAUSEA: 0
VOMITING: 0
CONSTIPATION: 1
ALLERGIC/IMMUNOLOGIC NEGATIVE: 1
EYES NEGATIVE: 1
COLOR CHANGE: 0

## 2018-10-22 ASSESSMENT — PAIN DESCRIPTION - LOCATION: LOCATION: BACK;HAND

## 2018-10-22 ASSESSMENT — PAIN DESCRIPTION - ORIENTATION: ORIENTATION: RIGHT

## 2018-10-22 ASSESSMENT — PAIN DESCRIPTION - PAIN TYPE: TYPE: CHRONIC PAIN

## 2018-10-22 ASSESSMENT — PAIN DESCRIPTION - ONSET: ONSET: ON-GOING

## 2018-10-22 ASSESSMENT — PAIN DESCRIPTION - PROGRESSION: CLINICAL_PROGRESSION: GRADUALLY WORSENING

## 2018-10-22 ASSESSMENT — PAIN DESCRIPTION - FREQUENCY: FREQUENCY: CONTINUOUS

## 2018-10-22 ASSESSMENT — PAIN SCALES - GENERAL
PAINLEVEL_OUTOF10: 8
PAINLEVEL_OUTOF10: 8

## 2018-10-22 NOTE — PROGRESS NOTES
spondylosis without myelopathy     Myalgia and myositis, unspecified     Myocardial infarct (HCC)     Opioid type dependence, continuous (HCC)     Osteoarthritis of ankle, left     Osteoarthritis of both hips     Osteoarthritis of both knees     Osteoporosis     Peripheral neuropathy     Peripheral vascular disease (HCC)     Polyneuropathy     Post traumatic stress disorder (PTSD)     Postlaminectomy syndrome, lumbar region     Thyrotoxic crisis     Tremors of nervous system        Surgical History  Past Surgical History:   Procedure Laterality Date    ABDOMEN SURGERY      bowel resection    ANGIOPLASTY      LCx, Prox    APPENDECTOMY      BACK SURGERY      CARDIAC SURGERY      2 heart stents    CAROTID ENDARTERECTOMY Bilateral      SECTION      x1    COLECTOMY  2009    EYE SURGERY      eye lids    FRACTURE SURGERY      left ankle    HYSTERECTOMY      LUMBAR FUSION  2006    OVARY REMOVAL      TONSILLECTOMY         Medications  Current Outpatient Prescriptions   Medication Sig Dispense Refill    oxyCODONE-acetaminophen (PERCOCET)  MG per tablet Take 1 tablet by mouth every 6 hours for 30 days. Please give up to 4 AM, 10 AM, 4 PM, 10 PM. Earliest Fill Date: 6/10/18 120 tablet 0    fentaNYL (DURAGESIC) 12 MCG/HR Place 1 patch onto the skin every 72 hours for 30 days. Earliest Fill Date: 18 10 patch 0    lidocaine (ASPERCREME W/LIDOCAINE) 4 % cream Apply topically as needed.  133 g 3    methocarbamol (ROBAXIN) 750 MG tablet Take 1 tablet by mouth 2 times daily as needed (painful muscle spasms in legs.) 60 tablet 5    potassium chloride (KLOR-CON) 20 MEQ packet Take 20 mEq by mouth daily Taking 1 tab. daily      albuterol (PROVENTIL) (2.5 MG/3ML) 0.083% nebulizer solution Take 2.5 mg by nebulization every 4 hours as needed for Wheezing      BISACODYL LAXATIVE RE Place rectally as needed      Alum & Mag Hydroxide-Simeth (MAALOX PLUS PO) Take by mouth      Nutritional

## 2018-10-29 ENCOUNTER — HOSPITAL ENCOUNTER (OUTPATIENT)
Dept: PAIN MANAGEMENT | Age: 73
Discharge: HOME | End: 2018-10-29
Payer: MEDICARE

## 2018-10-29 VITALS
HEART RATE: 72 BPM | RESPIRATION RATE: 16 BRPM | OXYGEN SATURATION: 95 % | DIASTOLIC BLOOD PRESSURE: 75 MMHG | TEMPERATURE: 98.6 F | HEIGHT: 60 IN | WEIGHT: 138 LBS | SYSTOLIC BLOOD PRESSURE: 189 MMHG | BODY MASS INDEX: 27.09 KG/M2

## 2018-10-29 DIAGNOSIS — M18.11 ARTHRITIS OF CARPOMETACARPAL (CMC) JOINT OF RIGHT THUMB: ICD-10-CM

## 2018-10-29 DIAGNOSIS — M18.11 DEGENERATIVE ARTHRITIS OF THUMB, RIGHT: Primary | ICD-10-CM

## 2018-10-29 PROCEDURE — 20600 DRAIN/INJ JOINT/BURSA W/O US: CPT

## 2018-10-29 PROCEDURE — 6360000002 HC RX W HCPCS

## 2018-10-29 PROCEDURE — 20600 DRAIN/INJ JOINT/BURSA W/O US: CPT | Performed by: PAIN MEDICINE

## 2018-10-29 RX ORDER — BUSPIRONE HYDROCHLORIDE 10 MG/1
10 TABLET ORAL 3 TIMES DAILY
COMMUNITY

## 2018-10-29 RX ORDER — FLUTICASONE PROPIONATE 50 MCG
SPRAY, SUSPENSION (ML) NASAL
COMMUNITY

## 2018-10-29 ASSESSMENT — PAIN DESCRIPTION - FREQUENCY: FREQUENCY: CONTINUOUS

## 2018-10-29 ASSESSMENT — PAIN DESCRIPTION - PAIN TYPE: TYPE: CHRONIC PAIN

## 2018-10-29 ASSESSMENT — PAIN DESCRIPTION - ONSET: ONSET: ON-GOING

## 2018-10-29 ASSESSMENT — PAIN DESCRIPTION - PROGRESSION: CLINICAL_PROGRESSION: NOT CHANGED

## 2018-10-29 ASSESSMENT — PAIN DESCRIPTION - ORIENTATION: ORIENTATION: RIGHT

## 2018-10-29 ASSESSMENT — PAIN - FUNCTIONAL ASSESSMENT: PAIN_FUNCTIONAL_ASSESSMENT: 0-10

## 2018-10-29 ASSESSMENT — PAIN DESCRIPTION - DESCRIPTORS: DESCRIPTORS: ACHING

## 2018-11-14 ENCOUNTER — HOSPITAL ENCOUNTER (OUTPATIENT)
Dept: PAIN MANAGEMENT | Age: 73
Discharge: HOME | End: 2018-11-14
Payer: MEDICARE

## 2018-11-14 VITALS
HEIGHT: 60 IN | TEMPERATURE: 97.5 F | OXYGEN SATURATION: 92 % | SYSTOLIC BLOOD PRESSURE: 119 MMHG | HEART RATE: 51 BPM | BODY MASS INDEX: 27.09 KG/M2 | DIASTOLIC BLOOD PRESSURE: 70 MMHG | RESPIRATION RATE: 14 BRPM | WEIGHT: 138 LBS

## 2018-11-14 DIAGNOSIS — M96.1 POSTLAMINECTOMY SYNDROME, LUMBAR REGION: ICD-10-CM

## 2018-11-14 DIAGNOSIS — M77.8 TENDINITIS OF THUMB: ICD-10-CM

## 2018-11-14 DIAGNOSIS — M48.061 SPINAL STENOSIS, LUMBAR REGION, WITHOUT NEUROGENIC CLAUDICATION: ICD-10-CM

## 2018-11-14 DIAGNOSIS — M18.11 DEGENERATIVE ARTHRITIS OF THUMB, RIGHT: ICD-10-CM

## 2018-11-14 DIAGNOSIS — M54.16 LUMBAR RADICULOPATHY: Primary | ICD-10-CM

## 2018-11-14 DIAGNOSIS — M54.16 LUMBAR RADICULOPATHY, CHRONIC: ICD-10-CM

## 2018-11-14 DIAGNOSIS — M18.11 ARTHRITIS OF CARPOMETACARPAL (CMC) JOINT OF RIGHT THUMB: ICD-10-CM

## 2018-11-14 DIAGNOSIS — M46.1 SACROILIITIS, NOT ELSEWHERE CLASSIFIED (HCC): ICD-10-CM

## 2018-11-14 PROCEDURE — 99214 OFFICE O/P EST MOD 30 MIN: CPT | Performed by: PAIN MEDICINE

## 2018-11-14 PROCEDURE — 99213 OFFICE O/P EST LOW 20 MIN: CPT

## 2018-11-14 RX ORDER — PREGABALIN 50 MG/1
50 CAPSULE ORAL 3 TIMES DAILY
COMMUNITY

## 2018-11-14 RX ORDER — OMEPRAZOLE 40 MG/1
40 CAPSULE, DELAYED RELEASE ORAL 2 TIMES DAILY
COMMUNITY

## 2018-11-14 RX ORDER — PREGABALIN 50 MG/1
100 CAPSULE ORAL 3 TIMES DAILY
COMMUNITY

## 2018-11-14 RX ORDER — METHIMAZOLE 10 MG/1
10 TABLET ORAL 3 TIMES DAILY
COMMUNITY

## 2018-11-14 ASSESSMENT — PAIN DESCRIPTION - FREQUENCY: FREQUENCY: INTERMITTENT

## 2018-11-14 ASSESSMENT — PAIN DESCRIPTION - DESCRIPTORS: DESCRIPTORS: ACHING

## 2018-11-14 ASSESSMENT — ENCOUNTER SYMPTOMS
EYE DISCHARGE: 0
COUGH: 0
ABDOMINAL PAIN: 0
SHORTNESS OF BREATH: 0
CHEST TIGHTNESS: 0
NAUSEA: 0
SORE THROAT: 0
CONSTIPATION: 1
ALLERGIC/IMMUNOLOGIC NEGATIVE: 1
WHEEZING: 0
EYE ITCHING: 0
BACK PAIN: 1
VOMITING: 0

## 2018-11-14 ASSESSMENT — PAIN DESCRIPTION - ORIENTATION: ORIENTATION: RIGHT

## 2018-11-14 ASSESSMENT — PAIN DESCRIPTION - PAIN TYPE: TYPE: CHRONIC PAIN

## 2018-11-14 ASSESSMENT — PAIN DESCRIPTION - ONSET: ONSET: ON-GOING

## 2018-11-14 ASSESSMENT — PAIN DESCRIPTION - LOCATION: LOCATION: FINGER (COMMENT WHICH ONE)

## 2018-11-14 ASSESSMENT — PAIN DESCRIPTION - PROGRESSION: CLINICAL_PROGRESSION: RAPIDLY IMPROVING

## 2018-11-14 ASSESSMENT — PAIN SCALES - GENERAL: PAINLEVEL_OUTOF10: 2

## 2018-11-14 NOTE — PROGRESS NOTES
factors include lack of exercise. OARRS compliant? not applicable  Concern for prescription abuse?not applicable    Current Pain Assessment  Pain Assessment  Pain Assessment: 0-10  Pain Level: 2  Pain Type: Chronic pain  Pain Location: Finger (Comment which one) (right thumb joint)  Pain Orientation: Right  Pain Radiating Towards: none  Pain Descriptors: Aching  Pain Frequency: Intermittent  Pain Onset: On-going  Clinical Progression: Rapidly improving  Effect of Pain on Daily Activities: use of hand/thumb, operating electric chair  Patient's Stated Pain Goal: 2 (using right thumb w/less pain, better now)  Pain Intervention(s): Medication (see eMar), Rest, Repositioned, Elevation, Heat applied                    ADVERSE MEDICATION EFFECTS:   Constipation: yes  Bowel Regimen: Yes  Diet: common adult  Appetite:  ok  Sedation:  no  Urinary Retention: no    FOCUSED PAINSCALE:  Highest : 5  Lowest :1  Average: Range-3  When and What  was your last procedure:    10/29/18 right metacarpophalangeal joint   Was your procedure effective:  Yes, 90% improved    ACTIVITY/SOCIAL/EMOTIONAL:  Sleep Pattern: 5 hours per night.  difficulty falling asleep, nightime awakenings and difficulty falling back asleep if awakened  Energy Level:  Tired/Fatigued  Currently attending Physical Therapy:  No  Home Exercises: walking twice a day but not the last 2 days, has electric w/c unsteady w/walking walker, standing unsteady balance unsteady  Mobility: unsteady, walker, balance unsteady, needs help, has electric w/c  Currently seeing a Psychiatrist or Psychologist:  Yes  Emotional Issues: anxiety/ nervousness   Mood: depression/PTSD     ABERRANT BEHAVIORS SINCE LAST VISIT:  Have you ever been treated in another [address] [place] [address] for prescriptions appropriate: no oaars done  Lost rx/pills:no  Taking more medication than prescribed:  no  Are you receiving PAIN medications from  other doctors: yesDr. Griffin  Last Urine/Serum Drug Screen :3/14/18  Was Serum/UDS as anticipated?   yes  Brought pill bottles in :not applicable   Was Pill count appropriate? :not applicable   Are currently pregnant?not applicable  Recent ER visits: No             Past Medical History      Diagnosis Date    Adrenal insufficiency (Moe's disease) ([facility])     on hydrocortisone daily    Anemia     Anxiety     Anxiety     Atrial fibrillation (HCC)     CAD (coronary artery disease)     at fib    Cellulitis     Cerebral vascular disease     CHF (congestive heart failure) ([facility])     Chronic kidney disease     Colon cancer (HCC)     Depression     Fibromyalgia     Fracture     right foot, no surgery    Heart disease     History of blood transfusion     Hyperlipidemia     Hypertension     Hypokalemia     Lumbosacral spondylosis without myelopathy     Myalgia and myositis, unspecified     Myocardial infarct ([facility])     Opioid type dependence, continuous ([facility])     Osteoarthritis of ankle, left     Osteoarthritis of both hips     Osteoarthritis of both knees     Osteoporosis     Peripheral neuropathy     Peripheral vascular disease ([facility])     Polyneuropathy     Post traumatic stress disorder (PTSD)     Postlaminectomy syndrome, lumbar region     Thyrotoxic crisis     Tremors of nervous system        Surgical History  Past Surgical History:   Procedure Laterality Date    ABDOMEN SURGERY      bowel resection    ANGIOPLASTY      LCx, Prox    APPENDECTOMY      BACK SURGERY      CARDIAC SURGERY      2 heart stents    CAROTID ENDARTERECTOMY Bilateral      SECTION      x1    COLECTOMY  2009    EYE SURGERY      eye lids    FRACTURE SURGERY      left ankle    HYSTERECTOMY      LUMBAR FUSION  2006    OVARY REMOVAL      TONSILLECTOMY         Medications  Current Outpatient Prescriptions   Medication Sig Dispense Refill    methimazole (TAPAZOLE) 10 MG tablet Take 10 mg by mouth 3 times daily      nefazodone (SERZONE) 100 MG tablet Take 100 mg by mouth 2 times daily      omeprazole (PRILOSEC) 40 MG delayed release capsule Take 40 mg by mouth 2 times daily      pregabalin (LYRICA) 50 MG capsule Take 100 mg by mouth 3 times daily. [facility] [employer] pregabalin (LYRICA) 50 MG capsule Take 50 mg by mouth 3 times daily. [facility] [employer] nicotine polacrilex (NICORETTE) 2 MG gum Take 2 mg by mouth as needed for Smoking cessation      fluticasone (FLONASE) 50 MCG/ACT nasal spray by Each Nare route      Multiple Vitamin (MULTI VITAMIN DAILY PO) Take by mouth      aspirin 81 MG tablet Take 81 mg by mouth daily      oxyCODONE-acetaminophen (PERCOCET)  MG per tablet Take 1 tablet by mouth every 6 hours for 30 days. Please give up to 4 AM, 10 AM, 4 PM, 10 PM. Earliest Fill Date: 6/10/18 120 tablet 0    lidocaine (ASPERCREME W/LIDOCAINE) 4 % cream Apply topically as needed. 133 g 3    methocarbamol (ROBAXIN) 750 MG tablet Take 1 tablet by mouth 2 times daily as needed (painful muscle spasms in legs.) 60 tablet 5    Alum & Mag Hydroxide-Simeth (MAALOX PLUS PO) Take by mouth      Nutritional Supplements (BOOST PO) Take by mouth      primidone (MYSOLINE) 50 MG tablet Take 200 mg by mouth nightly       nefazodone (SERZONE) 100 MG tablet Take 100 mg by mouth 2 times daily       Hypromellose (ARTIFICIAL TEARS OP) Apply to eye      bisacodyl (DULCOLAX) 5 MG EC tablet Take 5 mg by mouth daily as needed for Constipation      nitroGLYCERIN (NITROSTAT) 0.4 MG SL tablet Place 0.4 mg under the tongue every 5 minutes as needed for Chest pain up to max of 3 total doses. If no relief after 1 dose, call 911.       ferrous sulfate 325 (65 Fe) MG tablet Take 325 mg by mouth daily (with breakfast)      levETIRAcetam (KEPPRA) 750 MG tablet Take 750 mg by mouth 2 times daily      b complex vitamins capsule Take 1 capsule by mouth daily      promethazine (PHENERGAN) 25 MG tablet Take 12.5 mg by mouth every 6 hours as needed for Nausea      

## 2018-12-11 ENCOUNTER — HOSPITAL ENCOUNTER (OUTPATIENT)
Dept: GENERAL RADIOLOGY | Age: 73
Discharge: HOME | End: 2018-12-13
Payer: MEDICARE

## 2018-12-11 ENCOUNTER — HOSPITAL ENCOUNTER (OUTPATIENT)
Dept: PAIN MANAGEMENT | Age: 73
Discharge: HOME | End: 2018-12-11
Payer: MEDICARE

## 2018-12-11 VITALS
TEMPERATURE: 98.9 F | DIASTOLIC BLOOD PRESSURE: 66 MMHG | HEIGHT: 61 IN | RESPIRATION RATE: 18 BRPM | SYSTOLIC BLOOD PRESSURE: 168 MMHG | BODY MASS INDEX: 26.06 KG/M2 | HEART RATE: 66 BPM | OXYGEN SATURATION: 98 % | WEIGHT: 138 LBS

## 2018-12-11 DIAGNOSIS — M18.11 DEGENERATIVE ARTHRITIS OF THUMB, RIGHT: ICD-10-CM

## 2018-12-11 DIAGNOSIS — M54.16 LUMBAR RADICULOPATHY: ICD-10-CM

## 2018-12-11 DIAGNOSIS — M54.16 LUMBAR RADICULOPATHY, CHRONIC: Primary | ICD-10-CM

## 2018-12-11 DIAGNOSIS — M48.061 SPINAL STENOSIS, LUMBAR REGION, WITHOUT NEUROGENIC CLAUDICATION: ICD-10-CM

## 2018-12-11 PROCEDURE — 62323 NJX INTERLAMINAR LMBR/SAC: CPT | Performed by: PAIN MEDICINE

## 2018-12-11 PROCEDURE — 6360000002 HC RX W HCPCS

## 2018-12-11 PROCEDURE — 3209999900 FLUORO FOR SURGICAL PROCEDURES

## 2018-12-11 PROCEDURE — 62327 NJX INTERLAMINAR LMBR/SAC: CPT

## 2018-12-11 PROCEDURE — 6360000004 HC RX CONTRAST MEDICATION

## 2018-12-11 PROCEDURE — 2500000003 HC RX 250 WO HCPCS

## 2018-12-11 ASSESSMENT — PAIN DESCRIPTION - ONSET: ONSET: ON-GOING

## 2018-12-11 ASSESSMENT — PAIN DESCRIPTION - LOCATION: LOCATION: BACK

## 2018-12-11 ASSESSMENT — PAIN DESCRIPTION - DIRECTION: RADIATING_TOWARDS: LEFT LEG

## 2018-12-11 ASSESSMENT — PAIN SCALES - GENERAL: PAINLEVEL_OUTOF10: 2

## 2018-12-11 ASSESSMENT — ACTIVITIES OF DAILY LIVING (ADL): EFFECT OF PAIN ON DAILY ACTIVITIES: PAIN INCREASES WITH STANDING

## 2018-12-11 ASSESSMENT — PAIN DESCRIPTION - PROGRESSION: CLINICAL_PROGRESSION: GRADUALLY WORSENING

## 2018-12-11 ASSESSMENT — PAIN DESCRIPTION - FREQUENCY: FREQUENCY: CONTINUOUS

## 2018-12-11 ASSESSMENT — PAIN DESCRIPTION - ORIENTATION: ORIENTATION: LOWER;RIGHT;LEFT

## 2018-12-11 ASSESSMENT — PAIN DESCRIPTION - DESCRIPTORS: DESCRIPTORS: ACHING

## 2018-12-11 ASSESSMENT — PAIN DESCRIPTION - PAIN TYPE: TYPE: CHRONIC PAIN

## 2018-12-11 NOTE — PROGRESS NOTES
Discharge criteria met. Post procedure dressing dry and intact. Sensory and motor function intact as per pre-procedure. Patient alert and oriented x3  Instructions and follow up reviewed with pt at patient at discharge. Discharged home in motorized wheelchair accompanied by volunteer

## 2018-12-11 NOTE — PROCEDURES
Repositioned, Heat applied     Procedure:      Patient's vital signs including BP, EKG and SaO2 were monitored by the RN and they remained stable during the procedure. A meaningful communication was kept up with the patient throughout  the procedure. The patient is placed in prone position. Skin over the back was prepped and draped in sterile manner. Then using fluoroscopy the L3, L4 interspace was observed and the skin and deep tissues in the left paramedian area were infiltrated with 4 ml of 1% lidocaine. The #20-gauge, 3-1/2 inch Tuohy needle was inserted through the skin wheal and the epidural space was identified using loss of resistance technique with normal saline. This was confirmed with AP and lateral views using fluoroscopy after injecting about 1 ml of Omnipaque-180 and observing the spread of the contrast in the epidural space. Then after negative aspiration a total of 80 mg of triamcinolone with 8 ml of normal saline was injected into the epidural space. The needle is removed and a Band-Aid was placed over the needle insertion site. Patient's vital signs remained stable and the patient tolerated the procedure well. The patient was discharged home in stable condition and will be followed in the pain clinic in the next few weeks for further planning.     Electronically signed by Naman Arellano MD on 12/11/2018 at 10:14 AM

## 2019-01-08 ENCOUNTER — HOSPITAL ENCOUNTER (OUTPATIENT)
Dept: PAIN MANAGEMENT | Age: 74
Discharge: HOME | End: 2019-01-08
Payer: MEDICARE